# Patient Record
Sex: MALE | Race: WHITE | NOT HISPANIC OR LATINO | Employment: FULL TIME | URBAN - METROPOLITAN AREA
[De-identification: names, ages, dates, MRNs, and addresses within clinical notes are randomized per-mention and may not be internally consistent; named-entity substitution may affect disease eponyms.]

---

## 2020-10-17 ENCOUNTER — OFFICE VISIT (OUTPATIENT)
Dept: URGENT CARE | Facility: CLINIC | Age: 40
End: 2020-10-17
Payer: COMMERCIAL

## 2020-10-17 VITALS
BODY MASS INDEX: 28.88 KG/M2 | OXYGEN SATURATION: 98 % | TEMPERATURE: 98 F | SYSTOLIC BLOOD PRESSURE: 156 MMHG | HEIGHT: 74 IN | HEART RATE: 69 BPM | DIASTOLIC BLOOD PRESSURE: 93 MMHG | WEIGHT: 225 LBS

## 2020-10-17 DIAGNOSIS — Z20.822 CLOSE EXPOSURE TO COVID-19 VIRUS: Primary | ICD-10-CM

## 2020-10-17 LAB
CTP QC/QA: YES
SARS-COV-2 RDRP RESP QL NAA+PROBE: NEGATIVE

## 2020-10-17 PROCEDURE — U0002 COVID-19 LAB TEST NON-CDC: HCPCS | Mod: QW,S$GLB,, | Performed by: NURSE PRACTITIONER

## 2020-10-17 PROCEDURE — 99214 OFFICE O/P EST MOD 30 MIN: CPT | Mod: S$GLB,CS,, | Performed by: NURSE PRACTITIONER

## 2020-10-17 PROCEDURE — 99214 PR OFFICE/OUTPT VISIT, EST, LEVL IV, 30-39 MIN: ICD-10-PCS | Mod: S$GLB,CS,, | Performed by: NURSE PRACTITIONER

## 2020-10-17 PROCEDURE — U0002: ICD-10-PCS | Mod: QW,S$GLB,, | Performed by: NURSE PRACTITIONER

## 2020-10-17 RX ORDER — IBUPROFEN 800 MG/1
TABLET ORAL
COMMUNITY
Start: 2020-10-09

## 2020-10-17 RX ORDER — ZOLPIDEM TARTRATE 10 MG/1
TABLET ORAL
COMMUNITY
Start: 2020-09-28

## 2020-10-17 NOTE — PROGRESS NOTES
Subjective:       Patient ID: Gunnar Galindo is a 40 y.o. male.    Vitals:  vitals were not taken for this visit.     Chief Complaint: COVID-19 Concerns    Patient was exposed to covid -19.  Patient has no symptoms.      Constitution: Negative for chills, fatigue and fever.   HENT: Negative for congestion and sore throat.    Neck: Negative for painful lymph nodes.   Cardiovascular: Negative for chest pain and leg swelling.   Eyes: Negative for double vision and blurred vision.   Respiratory: Negative for cough and shortness of breath.    Gastrointestinal: Negative for nausea, vomiting and diarrhea.   Genitourinary: Negative for dysuria, frequency and urgency.   Musculoskeletal: Negative for joint pain, joint swelling, muscle cramps and muscle ache.   Skin: Negative for color change, pale and rash.   Allergic/Immunologic: Negative for seasonal allergies.   Neurological: Negative for dizziness, history of vertigo, light-headedness, passing out and headaches.   Hematologic/Lymphatic: Negative for swollen lymph nodes, easy bruising/bleeding and history of blood clots. Does not bruise/bleed easily.   Psychiatric/Behavioral: Negative for nervous/anxious, sleep disturbance and depression. The patient is not nervous/anxious.        Objective:      Physical Exam   Constitutional: He is oriented to person, place, and time. He appears well-developed. He is cooperative.  Non-toxic appearance. He does not appear ill. No distress.   HENT:   Head: Normocephalic and atraumatic.   Ears:   Right Ear: Hearing, tympanic membrane, external ear and ear canal normal.   Left Ear: Hearing, tympanic membrane, external ear and ear canal normal.   Nose: Nose normal. No mucosal edema, rhinorrhea or nasal deformity. No epistaxis. Right sinus exhibits no maxillary sinus tenderness and no frontal sinus tenderness. Left sinus exhibits no maxillary sinus tenderness and no frontal sinus tenderness.   Mouth/Throat: Uvula is midline, oropharynx  is clear and moist and mucous membranes are normal. No trismus in the jaw. Normal dentition. No uvula swelling. No oropharyngeal exudate, posterior oropharyngeal edema or posterior oropharyngeal erythema.   Eyes: Conjunctivae and lids are normal. No scleral icterus.   Neck: Trachea normal, full passive range of motion without pain and phonation normal. Neck supple. No neck rigidity. No edema and no erythema present.   Cardiovascular: Normal rate, regular rhythm, normal heart sounds and normal pulses.   Pulmonary/Chest: Effort normal and breath sounds normal. No respiratory distress. He has no decreased breath sounds. He has no rhonchi.   Abdominal: Normal appearance.   Musculoskeletal: Normal range of motion.         General: No deformity.   Neurological: He is alert and oriented to person, place, and time. He exhibits normal muscle tone. Coordination normal.   Skin: Skin is warm, dry, intact, not diaphoretic and not pale. Psychiatric: His speech is normal and behavior is normal. Judgment and thought content normal.   Nursing note and vitals reviewed.        Assessment:       1. Close exposure to COVID-19 virus        Plan:       Results for orders placed or performed in visit on 10/17/20   POCT COVID-19 Rapid Screening   Result Value Ref Range    POC Rapid COVID Negative Negative     Acceptable Yes        Close exposure to COVID-19 virus  -     POCT COVID-19 Rapid Screening      Patient Instructions   Please follow up with your Primary care provider within 2-5 days if your signs and symptoms have not resolved or worsen.     If your condition worsens or fails to improve we recommend that you receive another evaluation at the emergency room immediately or contact your primary medical clinic to discuss your concerns.    You must understand that you have received an Urgent Care treatment only and that you may be released before all of your medical problems are known or treated.   You, the patient, will  arrange for follow up care as instructed.       Instructions for Patients with Confirmed or Suspected COVID-19    If you are awaiting your test result, you will either be called or it will be released to the patient portal.  If you have any questions about your test, please visit [email or web address] or call our COVID-19 information line at [phone].      Instructions for non-hospitalized or discharged patients with confirmed or suspected COVID-19:       Stay home except to get medical care.    Separate yourself from other people and animals in your home.    Call ahead before visiting your doctor.    Wear a face mask.    Cover your coughs and sneezes.    Clean your hands often.    Avoid sharing personal household items.    Clean all high-touch surfaces every day.    Monitor your symptoms. Seek prompt medical attention if your illness is worsening (e.g., difficulty breathing). Before seeking care, call your healthcare provider.    If you have a medical emergency and must call 911, notify the dispatcher that you have or are being evaluated for COVID-19. If possible, put on a face mask before emergency medical services arrive.    Use the following symptom-based strategy to return to normal activity following a suspected or confirmed case of COVID-19. Continue isolation until:   o At least 3 days (72 hours) have passed since recovery defined as resolution of fever without the use of fever-reducing medications and improvement in respiratory symptoms (e.g. cough, shortness of breath), and   o At least 10 days have passed since the first positive test.       As one of the next steps, you will receive a call or text from the [place] Department of Health ([facility] COVID-19 Tracing Team. See the contact information below so you know not to ignore the health departments call. It is important that you contact them back immediately so they can help.     Contact Tracer Number:  [phone]  Caller ID for  most carriers: [facility]    What is contact tracing?   Contact tracing is a process that helps identify everyone who has been in close contact with an infected person. Contact tracers let those people know they may have been exposed and guide them on next steps. Confidentiality is important for everyone; no one will be told who may have exposed them to the virus.   Your involvement is important. The more we know about where and how this virus is spreading, the better chance we have at stopping it from spreading further.  What does exposure mean?   Exposure means you have been within 6 feet for more than 15 minutes with a person who has or had COVID-19.  What kind of questions do the contact tracers ask?   A contact tracer will confirm your basic contact information including name, address, phone number, and next of kin, as well as asking about any symptoms you may have had. Theyll also ask you how you think you may have gotten sick, such as places where you may have been exposed to the virus, and people you were with. Those names will never be shared with anyone outside of that call, and will only be used to help trace and stop the spread of the virus.   I have privacy concerns. How will the state use my information?   Your privacy about your health is important. All calls are completed using call centers that use the appropriate health privacy protection measures (HIPAA compliance), meaning that your patient information is safe. No one will ever ask you any questions related to immigration status. Your health comes first.   Do I have to participate?   You do not have to participate, but we strongly encourage you to. Contact tracing can help us catch and control new outbreaks as theyre developing to keep your friends and family safe.   What if I dont hear from anyone?   If you dont receive a call within 24 hours, you can call the number above right away to inquire about your status. That line is  open from 8:00 am - 8:00 p.m., 7 days a week.  Contact tracing saves lives! Together, we have the power to beat this virus and keep our loved ones and neighbors safe.       Instructions for household members, intimate partners and caregivers in a non-healthcare setting of a patient with confirmed or suspected COVID-19:         Close contacts should monitor their health and call their healthcare provider right away if they develop symptoms suggestive of COVID-19 (e.g., fever, cough, shortness of breath).    Stay home except to get medical care. Separate yourself from other people and animals in the home.   Monitor the patients symptoms. If the patient is getting sicker, call his or her healthcare provider. If the patient has a medical emergency and you need to call 911, notify the dispatch personnel that the patient has or is being evaluated for COVID-19.    Wear a facemask when around other people such as sharing a room or vehicle and before entering a healthcare provider's office.   Cover coughs and sneezes with a tissue. Throw used tissues in a lined trash can immediately and wash hands.   Clean hands often with soap and water for at least 20 seconds or with an alcohol-based hand , rubbing hands together until they feel dry. Avoid touching your eyes, nose, and mouth with unwashed hands.   Clean all high-touch; surfaces every day, including counters, tabletops, doorknobs, bathroom fixtures, toilets, phones, keyboards, tablets, bedside tables, etc. Use a household cleaning spray or wipe according to label instructions.   Avoid sharing personal household items such as dishes, drinking glasses, cups, towels, bedding, etc. After these items are used, they should be washed thoroughly with soap and water.   Continue isolation until:   At least 3 days (72 hours) have passed since recovery defined as resolution of fever without the use of fever-reducing medications and improvement in respiratory  symptoms (e.g. cough, shortness of breath), and    At least 10 days have passed since the patients first positive test.    https://www.cdc.gov/coronavirus/2019-ncov/your-health/index.htm

## 2020-10-17 NOTE — PATIENT INSTRUCTIONS
Please follow up with your Primary care provider within 2-5 days if your signs and symptoms have not resolved or worsen.     If your condition worsens or fails to improve we recommend that you receive another evaluation at the emergency room immediately or contact your primary medical clinic to discuss your concerns.    You must understand that you have received an Urgent Care treatment only and that you may be released before all of your medical problems are known or treated.   You, the patient, will arrange for follow up care as instructed.       Instructions for Patients with Confirmed or Suspected COVID-19    If you are awaiting your test result, you will either be called or it will be released to the patient portal.  If you have any questions about your test, please visit [email or web address] or call our COVID-19 information line at [phone].      Instructions for non-hospitalized or discharged patients with confirmed or suspected COVID-19:       Stay home except to get medical care.    Separate yourself from other people and animals in your home.    Call ahead before visiting your doctor.    Wear a face mask.    Cover your coughs and sneezes.    Clean your hands often.    Avoid sharing personal household items.    Clean all high-touch surfaces every day.    Monitor your symptoms. Seek prompt medical attention if your illness is worsening (e.g., difficulty breathing). Before seeking care, call your healthcare provider.    If you have a medical emergency and must call 911, notify the dispatcher that you have or are being evaluated for COVID-19. If possible, put on a face mask before emergency medical services arrive.    Use the following symptom-based strategy to return to normal activity following a suspected or confirmed case of COVID-19. Continue isolation until:   o At least 3 days (72 hours) have passed since recovery defined as resolution of fever without the use of fever-reducing  medications and improvement in respiratory symptoms (e.g. cough, shortness of breath), and   o At least 10 days have passed since the first positive test.       As one of the next steps, you will receive a call or text from the [place] Department of Health ([facility]) COVID-19 Tracing Team. See the contact information below so you know not to ignore the health departments call. It is important that you contact them back immediately so they can help.     Contact Tracer Number:  [phone]  Caller ID for most carriers: [facility]    What is contact tracing?   Contact tracing is a process that helps identify everyone who has been in close contact with an infected person. Contact tracers let those people know they may have been exposed and guide them on next steps. Confidentiality is important for everyone; no one will be told who may have exposed them to the virus.   Your involvement is important. The more we know about where and how this virus is spreading, the better chance we have at stopping it from spreading further.  What does exposure mean?   Exposure means you have been within 6 feet for more than 15 minutes with a person who has or had COVID-19.  What kind of questions do the contact tracers ask?   A contact tracer will confirm your basic contact information including name, address, phone number, and next of kin, as well as asking about any symptoms you may have had. Theyll also ask you how you think you may have gotten sick, such as places where you may have been exposed to the virus, and people you were with. Those names will never be shared with anyone outside of that call, and will only be used to help trace and stop the spread of the virus.   I have privacy concerns. How will the state use my information?   Your privacy about your health is important. All calls are completed using call centers that use the appropriate health privacy protection measures (HIPAA compliance), meaning that your  patient information is safe. No one will ever ask you any questions related to immigration status. Your health comes first.   Do I have to participate?   You do not have to participate, but we strongly encourage you to. Contact tracing can help us catch and control new outbreaks as theyre developing to keep your friends and family safe.   What if I dont hear from anyone?   If you dont receive a call within 24 hours, you can call the number above right away to inquire about your status. That line is open from 8:00 am - 8:00 p.m., 7 days a week.  Contact tracing saves lives! Together, we have the power to beat this virus and keep our loved ones and neighbors safe.       Instructions for household members, intimate partners and caregivers in a non-healthcare setting of a patient with confirmed or suspected COVID-19:         Close contacts should monitor their health and call their healthcare provider right away if they develop symptoms suggestive of COVID-19 (e.g., fever, cough, shortness of breath).    Stay home except to get medical care. Separate yourself from other people and animals in the home.   Monitor the patients symptoms. If the patient is getting sicker, call his or her healthcare provider. If the patient has a medical emergency and you need to call 911, notify the dispatch personnel that the patient has or is being evaluated for COVID-19.    Wear a facemask when around other people such as sharing a room or vehicle and before entering a healthcare provider's office.   Cover coughs and sneezes with a tissue. Throw used tissues in a lined trash can immediately and wash hands.   Clean hands often with soap and water for at least 20 seconds or with an alcohol-based hand , rubbing hands together until they feel dry. Avoid touching your eyes, nose, and mouth with unwashed hands.   Clean all high-touch; surfaces every day, including counters, tabletops, doorknobs, bathroom fixtures,  toilets, phones, keyboards, tablets, bedside tables, etc. Use a household cleaning spray or wipe according to label instructions.   Avoid sharing personal household items such as dishes, drinking glasses, cups, towels, bedding, etc. After these items are used, they should be washed thoroughly with soap and water.   Continue isolation until:   At least 3 days (72 hours) have passed since recovery defined as resolution of fever without the use of fever-reducing medications and improvement in respiratory symptoms (e.g. cough, shortness of breath), and    At least 10 days have passed since the patients first positive test.    https://www.cdc.gov/coronavirus/2019-ncov/your-health/index.htm

## 2023-01-03 ENCOUNTER — OFFICE VISIT (OUTPATIENT)
Dept: INTERNAL MEDICINE | Facility: CLINIC | Age: 43
End: 2023-01-03
Payer: COMMERCIAL

## 2023-01-03 VITALS
DIASTOLIC BLOOD PRESSURE: 86 MMHG | BODY MASS INDEX: 30.45 KG/M2 | OXYGEN SATURATION: 95 % | WEIGHT: 237.31 LBS | HEIGHT: 74 IN | SYSTOLIC BLOOD PRESSURE: 148 MMHG | TEMPERATURE: 99 F | HEART RATE: 104 BPM

## 2023-01-03 DIAGNOSIS — E66.9 OBESITY (BMI 30.0-34.9): ICD-10-CM

## 2023-01-03 DIAGNOSIS — I10 ESSENTIAL HYPERTENSION: Primary | ICD-10-CM

## 2023-01-03 DIAGNOSIS — F51.04 CHRONIC INSOMNIA: ICD-10-CM

## 2023-01-03 DIAGNOSIS — E78.2 MIXED HYPERLIPIDEMIA: ICD-10-CM

## 2023-01-03 DIAGNOSIS — Z11.4 SCREENING FOR HIV (HUMAN IMMUNODEFICIENCY VIRUS): ICD-10-CM

## 2023-01-03 DIAGNOSIS — Z11.59 NEED FOR HEPATITIS C SCREENING TEST: ICD-10-CM

## 2023-01-03 DIAGNOSIS — F17.200 TOBACCO USE DISORDER: ICD-10-CM

## 2023-01-03 PROBLEM — E66.811 OBESITY (BMI 30.0-34.9): Status: ACTIVE | Noted: 2022-07-15

## 2023-01-03 PROBLEM — E78.00 HYPERCHOLESTEROLEMIA: Status: ACTIVE | Noted: 2022-07-15

## 2023-01-03 PROCEDURE — 99999 PR PBB SHADOW E&M-EST. PATIENT-LVL IV: ICD-10-PCS | Mod: PBBFAC,,, | Performed by: FAMILY MEDICINE

## 2023-01-03 PROCEDURE — 90686 FLU VACCINE (QUAD) GREATER THAN OR EQUAL TO 3YO PRESERVATIVE FREE IM: ICD-10-PCS | Mod: S$GLB,,, | Performed by: FAMILY MEDICINE

## 2023-01-03 PROCEDURE — 99203 OFFICE O/P NEW LOW 30 MIN: CPT | Mod: 25,S$GLB,, | Performed by: FAMILY MEDICINE

## 2023-01-03 PROCEDURE — 90471 IMMUNIZATION ADMIN: CPT | Mod: S$GLB,,, | Performed by: FAMILY MEDICINE

## 2023-01-03 PROCEDURE — 90471 FLU VACCINE (QUAD) GREATER THAN OR EQUAL TO 3YO PRESERVATIVE FREE IM: ICD-10-PCS | Mod: S$GLB,,, | Performed by: FAMILY MEDICINE

## 2023-01-03 PROCEDURE — 90686 IIV4 VACC NO PRSV 0.5 ML IM: CPT | Mod: S$GLB,,, | Performed by: FAMILY MEDICINE

## 2023-01-03 PROCEDURE — 99203 PR OFFICE/OUTPT VISIT, NEW, LEVL III, 30-44 MIN: ICD-10-PCS | Mod: 25,S$GLB,, | Performed by: FAMILY MEDICINE

## 2023-01-03 PROCEDURE — 99999 PR PBB SHADOW E&M-EST. PATIENT-LVL IV: CPT | Mod: PBBFAC,,, | Performed by: FAMILY MEDICINE

## 2023-01-03 RX ORDER — METOPROLOL SUCCINATE 25 MG/1
25 TABLET, EXTENDED RELEASE ORAL DAILY
Qty: 30 TABLET | Refills: 2 | Status: SHIPPED | OUTPATIENT
Start: 2023-01-03 | End: 2023-10-03 | Stop reason: SDUPTHER

## 2023-01-03 NOTE — ASSESSMENT & PLAN NOTE
Body mass index is 30.47 kg/m².    Wt Readings from Last 10 Encounters:   01/03/23 107.6 kg (237 lb 5.2 oz)   10/17/20 102.1 kg (225 lb)

## 2023-01-03 NOTE — PROGRESS NOTES
Subjective:       Patient ID: Gunnar Galindo is a 42 y.o. male here today to establish care.    Chief Complaint: Hypertension    Patient presents to clinic today to establish care/address elevated blood pressure. Also desires to quit smoking. Reports ambien prn insomnia with little relief. Patient is otherwise without concerns today.    Review of Systems   Constitutional:  Negative for chills, fatigue, fever and unexpected weight change.   Eyes:  Negative for visual disturbance.   Respiratory:  Negative for shortness of breath.    Cardiovascular:  Negative for chest pain.   Musculoskeletal:  Negative for myalgias.   Neurological:  Negative for headaches.     Objective:      Physical Exam  Vitals reviewed.   Constitutional:       General: He is not in acute distress.     Appearance: He is well-developed.   HENT:      Head: Normocephalic and atraumatic.   Eyes:      General: Lids are normal. No scleral icterus.     Extraocular Movements: Extraocular movements intact.      Conjunctiva/sclera: Conjunctivae normal.      Pupils: Pupils are equal, round, and reactive to light.   Cardiovascular:      Rate and Rhythm: Normal rate and regular rhythm.      Heart sounds: No murmur heard.    No friction rub. No gallop.   Pulmonary:      Effort: Pulmonary effort is normal.      Breath sounds: Normal breath sounds. No decreased breath sounds, wheezing, rhonchi or rales.   Musculoskeletal:      Right lower leg: No edema.      Left lower leg: No edema.   Neurological:      Mental Status: He is alert and oriented to person, place, and time.      Cranial Nerves: No cranial nerve deficit.      Gait: Gait normal.   Psychiatric:         Mood and Affect: Mood and affect normal.       Assessment:       1. Essential hypertension    2. Tobacco use disorder    3. Mixed hyperlipidemia    4. Obesity (BMI 30.0-34.9)    5. Chronic insomnia    6. Need for hepatitis C screening test    7. Screening for HIV (human immunodeficiency virus)           Plan:   1. Essential hypertension  Assessment & Plan:  After discussion will start metoprolol 25 mg daily; patient will return in 1 month for follow-up; notify MD for any problems with medication    Orders:  -     metoprolol succinate (TOPROL-XL) 25 MG 24 hr tablet; Take 1 tablet (25 mg total) by mouth once daily.  Dispense: 30 tablet; Refill: 2    2. Tobacco use disorder  Assessment & Plan:  Referral to smoking cessation program    Orders:  -     Ambulatory referral/consult to Smoking Cessation Program; Future; Expected date: 01/10/2023    3. Mixed hyperlipidemia  Assessment & Plan:  Status pending labs    Orders:  -     Comprehensive Metabolic Panel; Future; Expected date: 01/03/2023  -     Lipid Panel; Future; Expected date: 01/03/2023    4. Obesity (BMI 30.0-34.9)  Assessment & Plan:  Body mass index is 30.47 kg/m².    Wt Readings from Last 10 Encounters:   01/03/23 107.6 kg (237 lb 5.2 oz)   10/17/20 102.1 kg (225 lb)           5. Chronic insomnia  Assessment & Plan:  On Ambien p.r.n. per previous primary care; documentation indicates about 3 times weekly; patient reports this has been ineffective recently; discussed sleep hygiene and encouraged to make some lifestyle changes; will reassess in 1 month and consider trazodone if needed; patient expressed understanding and agreement with plan.      6. Need for hepatitis C screening test  -     Hepatitis C Antibody; Future; Expected date: 01/03/2023    7. Screening for HIV (human immunodeficiency virus)  -     HIV 1/2 Ag/Ab (4th Gen); Future; Expected date: 01/03/2023        Health Maintenance reviewed/updated.  Flu vaccine today.

## 2023-01-03 NOTE — ASSESSMENT & PLAN NOTE
On Ambien p.r.n. per previous primary care; documentation indicates about 3 times weekly; patient reports this has been ineffective recently; discussed sleep hygiene and encouraged to make some lifestyle changes; will reassess in 1 month and consider trazodone if needed; patient expressed understanding and agreement with plan.

## 2023-01-03 NOTE — ASSESSMENT & PLAN NOTE
After discussion will start metoprolol 25 mg daily; patient will return in 1 month for follow-up; notify MD for any problems with medication

## 2023-01-12 ENCOUNTER — TELEPHONE (OUTPATIENT)
Dept: SMOKING CESSATION | Facility: CLINIC | Age: 43
End: 2023-01-12
Payer: COMMERCIAL

## 2023-01-12 ENCOUNTER — CLINICAL SUPPORT (OUTPATIENT)
Dept: SMOKING CESSATION | Facility: CLINIC | Age: 43
End: 2023-01-12

## 2023-01-12 DIAGNOSIS — F17.200 NICOTINE DEPENDENCE: Primary | ICD-10-CM

## 2023-01-12 PROCEDURE — 99404 PREV MED CNSL INDIV APPRX 60: CPT | Mod: S$GLB,,, | Performed by: GENERAL PRACTICE

## 2023-01-12 PROCEDURE — 99999 PR PBB SHADOW E&M-EST. PATIENT-LVL II: CPT | Mod: PBBFAC,,,

## 2023-01-12 PROCEDURE — 99404 PR PREVENT COUNSEL,INDIV,60 MIN: ICD-10-PCS | Mod: S$GLB,,, | Performed by: GENERAL PRACTICE

## 2023-01-12 PROCEDURE — 99999 PR PBB SHADOW E&M-EST. PATIENT-LVL II: ICD-10-PCS | Mod: PBBFAC,,,

## 2023-01-12 RX ORDER — BUPROPION HYDROCHLORIDE 150 MG/1
TABLET, EXTENDED RELEASE ORAL
Qty: 60 TABLET | Refills: 0 | Status: SHIPPED | OUTPATIENT
Start: 2023-01-12 | End: 2023-02-13

## 2023-01-12 NOTE — Clinical Note
Patient intake for Quit 1 Episode.  Patient will be participating in bi-weekly tobacco cessation meetings and will begin the prescribed tobacco cessation medication regimen of 150 mg Wellbutrin BID. FTND score of 5 indicates a moderate to high dependence on nicotine. ARTI-D score of 7 is perceived as no mental distress / depression at this time.

## 2023-01-24 ENCOUNTER — CLINICAL SUPPORT (OUTPATIENT)
Dept: SMOKING CESSATION | Facility: CLINIC | Age: 43
End: 2023-01-24

## 2023-01-24 DIAGNOSIS — F17.200 NICOTINE DEPENDENCE: Primary | ICD-10-CM

## 2023-01-24 PROCEDURE — 99402 PREV MED CNSL INDIV APPRX 30: CPT | Mod: 95,,, | Performed by: GENERAL PRACTICE

## 2023-01-24 PROCEDURE — 99402 PR PREVENT COUNSEL,INDIV,30 MIN: ICD-10-PCS | Mod: 95,,, | Performed by: GENERAL PRACTICE

## 2023-01-24 RX ORDER — DIPHENHYDRAMINE HCL 25 MG
4 CAPSULE ORAL
Qty: 100 EACH | Refills: 2 | Status: SHIPPED | OUTPATIENT
Start: 2023-01-24

## 2023-01-24 NOTE — Clinical Note
Patient was seen today by virtual visit with synchronous audio and video for a smoking cessation follow up visit. He is smoking 10-15 cpd and was smoking 20 cpd. Commended patient on his efforts towards quitting tobacco use. First thing in the morning and after eating are difficult times for him to refrain from smoking. He has been successful in not smoking for 4-5 hours at a time during the work day. Discussed nicotine vs. habit, identifying and managing triggers, healthy substitutions, strategies to eliminate tobacco, and reviewed instructions for use of 4 mg nicotine gum. The patient remains on the prescribed tobacco cessation medication regimen of 150 mg Wellbutrin BID without any negative side effects at this time.  This week he will work on limiting smoking to 10 cpd, increasing intervals of time between smoking, and using nicotine gum in the place of cigarettes. The patient denies any abnormal behavioral or mental changes at this time.  Will continue to encourage and monitor his progress.

## 2023-01-25 NOTE — PROGRESS NOTES
Individual Follow-Up Form    1/24/2023    Quit Date: TBD    Clinical Status of Patient: Outpatient    Continuing Medication: yes  Wellbutrin     Target Symptoms: Withdrawal and medication side effects. The following were  rated moderate (3) to severe (4) on TCRS:  Moderate (3): none  Severe (4): none    Comments: Patient was seen today by virtual visit with synchronous audio and video for a smoking cessation follow up visit. He is smoking 10-15 cpd and was smoking 20 cpd. Commended patient on his efforts towards quitting tobacco use. First thing in the morning and after eating are difficult times for him to refrain from smoking. He has been successful in not smoking for 4-5 hours at a time during the work day. Discussed nicotine vs. habit, identifying and managing triggers, healthy substitutions, strategies to eliminate tobacco, and reviewed instructions for use of 4 mg nicotine gum. The patient remains on the prescribed tobacco cessation medication regimen of 150 mg Wellbutrin BID without any negative side effects at this time.  This week he will work on limiting smoking to 10 cpd, increasing intervals of time between smoking, and using nicotine gum in the place of cigarettes. The patient denies any abnormal behavioral or mental changes at this time.  Will continue to encourage and monitor his progress.     Diagnosis: F17.200    Next Visit: 2 weeks

## 2023-02-04 ENCOUNTER — LAB VISIT (OUTPATIENT)
Dept: LAB | Facility: HOSPITAL | Age: 43
End: 2023-02-04
Attending: FAMILY MEDICINE
Payer: COMMERCIAL

## 2023-02-04 DIAGNOSIS — Z11.4 SCREENING FOR HIV (HUMAN IMMUNODEFICIENCY VIRUS): ICD-10-CM

## 2023-02-04 DIAGNOSIS — Z11.59 NEED FOR HEPATITIS C SCREENING TEST: ICD-10-CM

## 2023-02-04 DIAGNOSIS — E78.2 MIXED HYPERLIPIDEMIA: ICD-10-CM

## 2023-02-04 LAB
ALBUMIN SERPL BCP-MCNC: 4.3 G/DL (ref 3.5–5.2)
ALP SERPL-CCNC: 72 U/L (ref 55–135)
ALT SERPL W/O P-5'-P-CCNC: 36 U/L (ref 10–44)
ANION GAP SERPL CALC-SCNC: 11 MMOL/L (ref 8–16)
AST SERPL-CCNC: 16 U/L (ref 10–40)
BILIRUB SERPL-MCNC: 0.5 MG/DL (ref 0.1–1)
BUN SERPL-MCNC: 15 MG/DL (ref 6–20)
CALCIUM SERPL-MCNC: 9.7 MG/DL (ref 8.7–10.5)
CHLORIDE SERPL-SCNC: 108 MMOL/L (ref 95–110)
CHOLEST SERPL-MCNC: 203 MG/DL (ref 120–199)
CHOLEST/HDLC SERPL: 6 {RATIO} (ref 2–5)
CO2 SERPL-SCNC: 24 MMOL/L (ref 23–29)
CREAT SERPL-MCNC: 0.8 MG/DL (ref 0.5–1.4)
EST. GFR  (NO RACE VARIABLE): >60 ML/MIN/1.73 M^2
GLUCOSE SERPL-MCNC: 90 MG/DL (ref 70–110)
HCV AB SERPL QL IA: NORMAL
HDLC SERPL-MCNC: 34 MG/DL (ref 40–75)
HDLC SERPL: 16.7 % (ref 20–50)
HIV 1+2 AB+HIV1 P24 AG SERPL QL IA: NORMAL
LDLC SERPL CALC-MCNC: 109.8 MG/DL (ref 63–159)
NONHDLC SERPL-MCNC: 169 MG/DL
POTASSIUM SERPL-SCNC: 5.1 MMOL/L (ref 3.5–5.1)
PROT SERPL-MCNC: 7.1 G/DL (ref 6–8.4)
SODIUM SERPL-SCNC: 143 MMOL/L (ref 136–145)
TRIGL SERPL-MCNC: 296 MG/DL (ref 30–150)

## 2023-02-04 PROCEDURE — 87389 HIV-1 AG W/HIV-1&-2 AB AG IA: CPT | Performed by: FAMILY MEDICINE

## 2023-02-04 PROCEDURE — 36415 COLL VENOUS BLD VENIPUNCTURE: CPT | Performed by: FAMILY MEDICINE

## 2023-02-04 PROCEDURE — 80061 LIPID PANEL: CPT | Performed by: FAMILY MEDICINE

## 2023-02-04 PROCEDURE — 86803 HEPATITIS C AB TEST: CPT | Performed by: FAMILY MEDICINE

## 2023-02-04 PROCEDURE — 80053 COMPREHEN METABOLIC PANEL: CPT | Performed by: FAMILY MEDICINE

## 2023-02-13 ENCOUNTER — CLINICAL SUPPORT (OUTPATIENT)
Dept: SMOKING CESSATION | Facility: CLINIC | Age: 43
End: 2023-02-13

## 2023-02-13 DIAGNOSIS — F17.200 NICOTINE DEPENDENCE: Primary | ICD-10-CM

## 2023-02-13 PROCEDURE — 99402 PREV MED CNSL INDIV APPRX 30: CPT | Mod: S$GLB,,, | Performed by: GENERAL PRACTICE

## 2023-02-13 PROCEDURE — 99402 PR PREVENT COUNSEL,INDIV,30 MIN: ICD-10-PCS | Mod: S$GLB,,, | Performed by: GENERAL PRACTICE

## 2023-02-13 PROCEDURE — 99999 PR PBB SHADOW E&M-EST. PATIENT-LVL I: ICD-10-PCS | Mod: PBBFAC,,,

## 2023-02-13 PROCEDURE — 99999 PR PBB SHADOW E&M-EST. PATIENT-LVL I: CPT | Mod: PBBFAC,,,

## 2023-02-13 NOTE — PROGRESS NOTES
Individual Follow-Up Form    2/13/2023    Quit Date: TBD    Clinical Status of Patient: Outpatient    Continuing Medication: yes  Wellbutrin    Other Medications: nicotine gum     Target Symptoms: Withdrawal and medication side effects. The following were  rated moderate (3) to severe (4) on TCRS:  Moderate (3): none  Severe (4): none    Comments: Spoke to patient over the phone in regard to his smoking cessation progress. He is smoking 3-4 cpd and was smoking 20 cpd. Commended patient on his progress towards quitting tobacco use. First thing in the morning is the most difficult time for him to refrain from smoking. Discussed using nicotine gum upon awakening in the morning to alleviate strong craving to smoke in the morning. The patient remains on the prescribed tobacco cessation medication regimen of 150 mg Wellbutrin BID and 4 mg nicotine gum as needed without any negative side effects at this time.  The patient denies any abnormal behavioral or mental changes at this time.  Will continue to encourage and monitor his progress.     Diagnosis: F17.200    Next Visit: 3 weeks

## 2023-02-13 NOTE — Clinical Note
Spoke to patient over the phone in regard to his smoking cessation progress. He is smoking 3-4 cpd and was smoking 20 cpd. Commended patient on his progress towards quitting tobacco use. First thing in the morning is the most difficult time for him to refrain from smoking. Discussed using nicotine gum upon awakening in the morning to alleviate strong craving to smoke in the morning. The patient remains on the prescribed tobacco cessation medication regimen of 150 mg Wellbutrin BID and 4 mg nicotine gum as needed without any negative side effects at this time.  The patient denies any abnormal behavioral or mental changes at this time.  Will continue to encourage and monitor his progress.

## 2023-03-30 ENCOUNTER — TELEPHONE (OUTPATIENT)
Dept: SMOKING CESSATION | Facility: CLINIC | Age: 43
End: 2023-03-30
Payer: COMMERCIAL

## 2023-03-30 NOTE — TELEPHONE ENCOUNTER
Attempted to contact patient in regard to cancelled virtual appt. Left recorded message with return contact information.

## 2023-04-17 ENCOUNTER — TELEPHONE (OUTPATIENT)
Dept: SMOKING CESSATION | Facility: CLINIC | Age: 43
End: 2023-04-17
Payer: COMMERCIAL

## 2023-07-24 ENCOUNTER — TELEPHONE (OUTPATIENT)
Dept: SMOKING CESSATION | Facility: CLINIC | Age: 43
End: 2023-07-24
Payer: COMMERCIAL

## 2023-10-03 ENCOUNTER — OFFICE VISIT (OUTPATIENT)
Dept: INTERNAL MEDICINE | Facility: CLINIC | Age: 43
End: 2023-10-03
Payer: COMMERCIAL

## 2023-10-03 VITALS
HEART RATE: 92 BPM | WEIGHT: 246.25 LBS | DIASTOLIC BLOOD PRESSURE: 94 MMHG | SYSTOLIC BLOOD PRESSURE: 142 MMHG | OXYGEN SATURATION: 96 % | TEMPERATURE: 97 F | BODY MASS INDEX: 31.62 KG/M2

## 2023-10-03 DIAGNOSIS — E78.2 MIXED HYPERLIPIDEMIA: ICD-10-CM

## 2023-10-03 DIAGNOSIS — Z23 NEED FOR INFLUENZA VACCINATION: ICD-10-CM

## 2023-10-03 DIAGNOSIS — F17.210 CIGARETTE NICOTINE DEPENDENCE WITHOUT COMPLICATION: ICD-10-CM

## 2023-10-03 DIAGNOSIS — F51.04 CHRONIC INSOMNIA: ICD-10-CM

## 2023-10-03 DIAGNOSIS — Z13.1 SCREENING FOR DIABETES MELLITUS: ICD-10-CM

## 2023-10-03 DIAGNOSIS — I10 ESSENTIAL HYPERTENSION: ICD-10-CM

## 2023-10-03 DIAGNOSIS — Z00.00 ROUTINE GENERAL MEDICAL EXAMINATION AT A HEALTH CARE FACILITY: Primary | ICD-10-CM

## 2023-10-03 DIAGNOSIS — F17.200 TOBACCO USE DISORDER: ICD-10-CM

## 2023-10-03 PROBLEM — E66.9 OBESITY (BMI 30.0-34.9): Status: RESOLVED | Noted: 2022-07-15 | Resolved: 2023-10-03

## 2023-10-03 PROBLEM — E66.811 OBESITY (BMI 30.0-34.9): Status: RESOLVED | Noted: 2022-07-15 | Resolved: 2023-10-03

## 2023-10-03 PROCEDURE — 3080F DIAST BP >= 90 MM HG: CPT | Mod: CPTII,S$GLB,, | Performed by: PHYSICIAN ASSISTANT

## 2023-10-03 PROCEDURE — 3008F BODY MASS INDEX DOCD: CPT | Mod: CPTII,S$GLB,, | Performed by: PHYSICIAN ASSISTANT

## 2023-10-03 PROCEDURE — 90686 FLU VACCINE (QUAD) GREATER THAN OR EQUAL TO 3YO PRESERVATIVE FREE IM: ICD-10-PCS | Mod: S$GLB,,, | Performed by: PHYSICIAN ASSISTANT

## 2023-10-03 PROCEDURE — 1159F PR MEDICATION LIST DOCUMENTED IN MEDICAL RECORD: ICD-10-PCS | Mod: CPTII,S$GLB,, | Performed by: PHYSICIAN ASSISTANT

## 2023-10-03 PROCEDURE — 99999 PR PBB SHADOW E&M-EST. PATIENT-LVL IV: ICD-10-PCS | Mod: PBBFAC,,, | Performed by: PHYSICIAN ASSISTANT

## 2023-10-03 PROCEDURE — 3077F PR MOST RECENT SYSTOLIC BLOOD PRESSURE >= 140 MM HG: ICD-10-PCS | Mod: CPTII,S$GLB,, | Performed by: PHYSICIAN ASSISTANT

## 2023-10-03 PROCEDURE — 3008F PR BODY MASS INDEX (BMI) DOCUMENTED: ICD-10-PCS | Mod: CPTII,S$GLB,, | Performed by: PHYSICIAN ASSISTANT

## 2023-10-03 PROCEDURE — 99396 PREV VISIT EST AGE 40-64: CPT | Mod: 25,S$GLB,, | Performed by: PHYSICIAN ASSISTANT

## 2023-10-03 PROCEDURE — 90686 IIV4 VACC NO PRSV 0.5 ML IM: CPT | Mod: S$GLB,,, | Performed by: PHYSICIAN ASSISTANT

## 2023-10-03 PROCEDURE — 90471 FLU VACCINE (QUAD) GREATER THAN OR EQUAL TO 3YO PRESERVATIVE FREE IM: ICD-10-PCS | Mod: S$GLB,,, | Performed by: PHYSICIAN ASSISTANT

## 2023-10-03 PROCEDURE — 3077F SYST BP >= 140 MM HG: CPT | Mod: CPTII,S$GLB,, | Performed by: PHYSICIAN ASSISTANT

## 2023-10-03 PROCEDURE — 3080F PR MOST RECENT DIASTOLIC BLOOD PRESSURE >= 90 MM HG: ICD-10-PCS | Mod: CPTII,S$GLB,, | Performed by: PHYSICIAN ASSISTANT

## 2023-10-03 PROCEDURE — 1159F MED LIST DOCD IN RCRD: CPT | Mod: CPTII,S$GLB,, | Performed by: PHYSICIAN ASSISTANT

## 2023-10-03 PROCEDURE — 99999 PR PBB SHADOW E&M-EST. PATIENT-LVL IV: CPT | Mod: PBBFAC,,, | Performed by: PHYSICIAN ASSISTANT

## 2023-10-03 PROCEDURE — 99396 PR PREVENTIVE VISIT,EST,40-64: ICD-10-PCS | Mod: 25,S$GLB,, | Performed by: PHYSICIAN ASSISTANT

## 2023-10-03 PROCEDURE — 90471 IMMUNIZATION ADMIN: CPT | Mod: S$GLB,,, | Performed by: PHYSICIAN ASSISTANT

## 2023-10-03 RX ORDER — BUPROPION HYDROCHLORIDE 150 MG/1
TABLET, EXTENDED RELEASE ORAL
Qty: 60 TABLET | Refills: 5 | Status: SHIPPED | OUTPATIENT
Start: 2023-10-03

## 2023-10-03 RX ORDER — METOPROLOL SUCCINATE 25 MG/1
25 TABLET, EXTENDED RELEASE ORAL DAILY
Qty: 30 TABLET | Refills: 11 | Status: SHIPPED | OUTPATIENT
Start: 2023-10-03 | End: 2024-10-02

## 2023-10-03 NOTE — ASSESSMENT & PLAN NOTE
/94, start metoprolol, apparently patient did not start this medication at last visit.  Schedule nurse visit in 2 weeks for BP recheck

## 2023-10-03 NOTE — PROGRESS NOTES
Subjective:       Patient ID: Gunnar Galindo is a 43 y.o. male.    Chief Complaint: Annual Exam      Patient presents to clinic today for annual physical exam.        Review of Systems   Constitutional:  Negative for activity change and unexpected weight change.   HENT:  Negative for hearing loss, rhinorrhea and trouble swallowing.    Eyes:  Negative for discharge and visual disturbance.   Respiratory:  Negative for chest tightness and wheezing.    Cardiovascular:  Negative for chest pain and palpitations.   Gastrointestinal:  Negative for blood in stool, constipation, diarrhea and vomiting.   Endocrine: Negative for polydipsia and polyuria.   Genitourinary:  Negative for difficulty urinating, hematuria and urgency.   Musculoskeletal:  Negative for arthralgias, joint swelling and neck pain.   Neurological:  Positive for headaches. Negative for weakness.   Psychiatric/Behavioral:  Negative for confusion and dysphoric mood.        Objective:      Physical Exam  Vitals and nursing note reviewed.   Constitutional:       General: He is not in acute distress.     Appearance: He is well-developed.   HENT:      Head: Normocephalic and atraumatic.      Right Ear: Tympanic membrane, ear canal and external ear normal.      Left Ear: Tympanic membrane, ear canal and external ear normal.      Nose: Nose normal.      Mouth/Throat:      Lips: Pink.      Mouth: Mucous membranes are moist.      Pharynx: Oropharynx is clear. Uvula midline.   Eyes:      General: Lids are normal. No scleral icterus.     Conjunctiva/sclera: Conjunctivae normal.      Pupils: Pupils are equal, round, and reactive to light.   Neck:      Thyroid: No thyromegaly.   Cardiovascular:      Rate and Rhythm: Normal rate and regular rhythm.      Pulses: Normal pulses.   Pulmonary:      Effort: Pulmonary effort is normal.      Breath sounds: Normal breath sounds. No wheezing or rales.   Abdominal:      General: Bowel sounds are normal. There is no distension.       Palpations: Abdomen is soft. There is no mass.      Tenderness: There is no abdominal tenderness.   Musculoskeletal:         General: No tenderness. Normal range of motion.      Cervical back: Normal range of motion and neck supple.      Right lower leg: No edema.      Left lower leg: No edema.   Lymphadenopathy:      Cervical: No cervical adenopathy.   Skin:     General: Skin is warm and dry.      Findings: No rash.   Neurological:      Mental Status: He is alert.      Cranial Nerves: No cranial nerve deficit.   Psychiatric:         Mood and Affect: Mood and affect normal.         Assessment:       1. Routine general medical examination at a health care facility    2. Essential hypertension    3. Mixed hyperlipidemia    4. Chronic insomnia    5. Cigarette nicotine dependence without complication    6. Screening for diabetes mellitus    7. Need for influenza vaccination    8. Tobacco use disorder        Plan:   1. Routine general medical examination at a health care facility    2. Essential hypertension  Assessment & Plan:  /94, start metoprolol, apparently patient did not start this medication at last visit.  Schedule nurse visit in 2 weeks for BP recheck    Orders:  -     metoprolol succinate (TOPROL-XL) 25 MG 24 hr tablet; Take 1 tablet (25 mg total) by mouth once daily.  Dispense: 30 tablet; Refill: 11    3. Mixed hyperlipidemia  Assessment & Plan:  Status pending lab, encouraged to start fish oil daily, lifestyle and diet changes encouraged    Orders:  -     Comprehensive Metabolic Panel; Future; Expected date: 04/03/2024  -     Lipid Panel; Future; Expected date: 04/03/2024    4. Chronic insomnia  Assessment & Plan:  Consider trazodone in the future      5. Cigarette nicotine dependence without complication  -     buPROPion (WELLBUTRIN SR) 150 MG TBSR 12 hr tablet; TAKE 1 TABLET BY MOUTH EVERY DAY FOR 7 DAYS THEN TAKE 1 TABLET BY MOUTH TWICE DAILY  Dispense: 60 tablet; Refill: 5    6. Screening for  diabetes mellitus  -     Hemoglobin A1C; Future; Expected date: 04/03/2024    7. Need for influenza vaccination    8. Tobacco use disorder  Assessment & Plan:  Patient requesting to restart Wellbutrin, continue nicotine patches      Other orders  -     Influenza - Quadrivalent (PF)        Flu shot today  6 month f/u with Dr. Lynn scheduled with fasting labs PTA   Health Maintenance reviewed/updated.

## 2023-12-21 ENCOUNTER — ON-DEMAND VIRTUAL (OUTPATIENT)
Dept: URGENT CARE | Facility: CLINIC | Age: 43
End: 2023-12-21
Payer: COMMERCIAL

## 2023-12-21 DIAGNOSIS — B34.9 VIRAL ILLNESS: Primary | ICD-10-CM

## 2023-12-21 PROCEDURE — 99202 PR OFFICE/OUTPT VISIT, NEW, LEVL II, 15-29 MIN: ICD-10-PCS | Mod: 95,,, | Performed by: NURSE PRACTITIONER

## 2023-12-21 PROCEDURE — 99202 OFFICE O/P NEW SF 15 MIN: CPT | Mod: 95,,, | Performed by: NURSE PRACTITIONER

## 2023-12-21 NOTE — PROGRESS NOTES
Subjective:      Patient ID: Gunnar Galindo is a 43 y.o. male.    Vitals:  vitals were not taken for this visit.     Chief Complaint: URI      Visit Type: TELE AUDIOVISUAL    Present with the patient at the time of consultation: TELEMED PRESENT WITH PATIENT: None    Past Medical History:   Diagnosis Date    Hypertension      Past Surgical History:   Procedure Laterality Date    APPENDECTOMY      EYE SURGERY      eye sx  05/2020    FRACTURE SURGERY      KNEE SURGERY  1999    wrist sx  2015     Review of patient's allergies indicates:  No Known Allergies  Current Outpatient Medications on File Prior to Visit   Medication Sig Dispense Refill    buPROPion (WELLBUTRIN SR) 150 MG TBSR 12 hr tablet TAKE 1 TABLET BY MOUTH EVERY DAY FOR 7 DAYS THEN TAKE 1 TABLET BY MOUTH TWICE DAILY 60 tablet 5    ibuprofen (ADVIL,MOTRIN) 800 MG tablet TK 1 T PO  Q 6 TO 8 H PRN P      metoprolol succinate (TOPROL-XL) 25 MG 24 hr tablet Take 1 tablet (25 mg total) by mouth once daily. 30 tablet 11    nicotine polacrilex (NICORETTE) 4 MG Gum Take 1 each (4 mg total) by mouth as needed (Use 5-6 pieces of gum per day in the place of cigarettes). 100 each 2    zolpidem (AMBIEN) 10 mg Tab TK 1 T PO Q NIGHT PRF SLP       No current facility-administered medications on file prior to visit.     Family History   Problem Relation Age of Onset    Diabetes Paternal Grandmother     Cancer Paternal Grandfather         Prostate cancer           Ohs Peq Odvv Intake    12/21/2023  6:58 AM CST - Filed by Patient   Describe your reason for todays visit    What is your current physical address in the event of a medical emergency? [id number]   Are you able to take your vital signs? No   Please attach any relevant images or files          Flu-like symptoms for 3 days. Fever(100.5),  congestion and cough. No known exposures. No COVID testing. Taking OTC meds with little relief.    URI   Associated symptoms include congestion and coughing. Pertinent  negatives include no diarrhea, ear pain, headaches, nausea, sore throat, vomiting or wheezing.       Constitution: Positive for chills and fever.   HENT:  Positive for congestion. Negative for ear pain and sore throat.    Neck: Negative for painful lymph nodes.   Respiratory:  Positive for cough. Negative for shortness of breath and wheezing.    Gastrointestinal:  Negative for nausea, vomiting and diarrhea.   Musculoskeletal:  Positive for muscle ache.   Neurological:  Negative for headaches.   Hematologic/Lymphatic: Negative for swollen lymph nodes.        Objective:   The physical exam was conducted virtually.  Physical Exam   Constitutional: He is oriented to person, place, and time. He does not appear ill. No distress.   HENT:   Head: Normocephalic and atraumatic.   Nose: Nose normal.   Eyes: Extraocular movement intact   Pulmonary/Chest: Effort normal.   Abdominal: Normal appearance.   Musculoskeletal: Normal range of motion.         General: Normal range of motion.   Neurological: no focal deficit. He is alert and oriented to person, place, and time.   Psychiatric: His behavior is normal. Mood normal.   Vitals reviewed.      Assessment:     1. Viral illness        Plan:   Patient encouraged to monitor symptoms closely and instructed to follow-up for new or worsening symptoms. Further, in-person, evaluation may be necessary for continued treatment. Please follow up with your primary care doctor or specialist as needed. Verbally discussed plan. Patient confirms understanding and is in agreement with treatment and plan.     You must understand that you've received a Virtual Care evaluation only and that you may be released before all your medical problems are known or treated. You, the patient, will arrange for follow up care as instructed.      Viral illness      Patient Instructions   OVER THE COUNTER RECOMMENDATIONS/SUGGESTIONS (IF NO CONTRAINDICATIONS).     ·         Make sure to stay well hydrated.     ·          Use Nasal Saline to mechanically move any post nasal drip from your eustachian tube or from the back of your throat.     ·         Use warm saltwater gargles to ease your throat pain. Warm saltwater gargles as needed for sore throat-  1/2 tsp salt to 1 cup warm water, gargle as desired. Warm fluids tend to relieve a sore throat.     .         Throat lozenges, Chloraseptic spray or other over the counter treatments are ok to use as well. Use as directed.     ·         Use an antihistamine such as Claritin, Zyrtec or Allegra to dry you out.     ·         Use pseudoephedrine (behind the counter) to decongest. Pseudoephedrine  30 mg up to 240 mg /day. It can raise your blood pressure and give you palpitations.     ·         Use Mucinex (guaifenesin) to break up mucous up to 2400mg/day to loosen any mucous.     ·         The Mucinex DM pill has a cough suppressant that can be sedating. It can be used at night to stop the tickle at the back of your throat.     ·         You can use Mucinex D (it has guaifenesin and a high dose of pseudoephedrine) in the mornings to help decongest.     ·         Use Afrin (oxymetazoline) in each nare for no longer than 3 days, as it is addictive. It can also dry out your mucous membranes and cause elevated blood pressure. This is especially useful if you are flying.     ·         Use Flonase 1-2 sprays/nostril per day. It is a local acting steroid nasal spray, if you develop a bloody nose, stop using the medication immediately.     ·         Sometimes Nyquil at night is beneficial to help you get some rest, however it is sedating, and it does have an antihistamine, and Tylenol.     ·         Honey is a natural cough suppressant that can be used.     ·         Tylenol up to 4,000 mg a day is safe for short periods and can be used for body aches, pain, and fever. However, in high doses and prolonged use it can cause liver irritation.     ·         Ibuprofen is a non-steroidal  anti-inflammatory that can be used for body aches, pain, and fever. However, it can also cause stomach irritation if overused.

## 2023-12-21 NOTE — LETTER
December 21, 2023    Gunnar Galindo  [address]  [place] [address]             Virtual Visit - Urgent Care  Urgent Care  [address] [facility] [id number]   December 21, 2023     Patient: Gunnar Galindo   YOB: 1980   Date of Visit: 12/21/2023       To Whom it May Concern:    Gunnar Galindo was seen virtually on 12/21/2023. He  may return to work once symptoms have improved and he has been fever free for 24 hours without taking fever reducing medications.    Please excuse him from any classes or work missed.    If you have any questions or concerns, please don't hesitate to call.    Sincerely,         Lucy Aburto, NP

## 2024-01-23 ENCOUNTER — TELEPHONE (OUTPATIENT)
Dept: SMOKING CESSATION | Facility: CLINIC | Age: 44
End: 2024-01-23
Payer: COMMERCIAL

## 2024-04-05 ENCOUNTER — LAB VISIT (OUTPATIENT)
Dept: LAB | Facility: HOSPITAL | Age: 44
End: 2024-04-05
Attending: PHYSICIAN ASSISTANT
Payer: COMMERCIAL

## 2024-04-05 DIAGNOSIS — E78.2 MIXED HYPERLIPIDEMIA: ICD-10-CM

## 2024-04-05 DIAGNOSIS — Z13.1 SCREENING FOR DIABETES MELLITUS: ICD-10-CM

## 2024-04-05 LAB
ALBUMIN SERPL BCP-MCNC: 4 G/DL (ref 3.5–5.2)
ALP SERPL-CCNC: 82 U/L (ref 55–135)
ALT SERPL W/O P-5'-P-CCNC: 45 U/L (ref 10–44)
ANION GAP SERPL CALC-SCNC: 9 MMOL/L (ref 8–16)
AST SERPL-CCNC: 24 U/L (ref 10–40)
BILIRUB SERPL-MCNC: 0.3 MG/DL (ref 0.1–1)
BUN SERPL-MCNC: 13 MG/DL (ref 6–20)
CALCIUM SERPL-MCNC: 10.2 MG/DL (ref 8.7–10.5)
CHLORIDE SERPL-SCNC: 106 MMOL/L (ref 95–110)
CHOLEST SERPL-MCNC: 201 MG/DL (ref 120–199)
CHOLEST/HDLC SERPL: 6.3 {RATIO} (ref 2–5)
CO2 SERPL-SCNC: 25 MMOL/L (ref 23–29)
CREAT SERPL-MCNC: 0.8 MG/DL (ref 0.5–1.4)
EST. GFR  (NO RACE VARIABLE): >60 ML/MIN/1.73 M^2
ESTIMATED AVG GLUCOSE: 108 MG/DL (ref 68–131)
GLUCOSE SERPL-MCNC: 93 MG/DL (ref 70–110)
HBA1C MFR BLD: 5.4 % (ref 4–5.6)
HDLC SERPL-MCNC: 32 MG/DL (ref 40–75)
HDLC SERPL: 15.9 % (ref 20–50)
LDLC SERPL CALC-MCNC: 124.6 MG/DL (ref 63–159)
NONHDLC SERPL-MCNC: 169 MG/DL
POTASSIUM SERPL-SCNC: 5 MMOL/L (ref 3.5–5.1)
PROT SERPL-MCNC: 6.9 G/DL (ref 6–8.4)
SODIUM SERPL-SCNC: 140 MMOL/L (ref 136–145)
TRIGL SERPL-MCNC: 222 MG/DL (ref 30–150)

## 2024-04-05 PROCEDURE — 36415 COLL VENOUS BLD VENIPUNCTURE: CPT | Performed by: PHYSICIAN ASSISTANT

## 2024-04-05 PROCEDURE — 83036 HEMOGLOBIN GLYCOSYLATED A1C: CPT | Performed by: PHYSICIAN ASSISTANT

## 2024-04-05 PROCEDURE — 80053 COMPREHEN METABOLIC PANEL: CPT | Performed by: PHYSICIAN ASSISTANT

## 2024-04-05 PROCEDURE — 80061 LIPID PANEL: CPT | Performed by: PHYSICIAN ASSISTANT

## 2024-04-10 ENCOUNTER — OFFICE VISIT (OUTPATIENT)
Dept: INTERNAL MEDICINE | Facility: CLINIC | Age: 44
End: 2024-04-10
Payer: COMMERCIAL

## 2024-04-10 VITALS — DIASTOLIC BLOOD PRESSURE: 90 MMHG | SYSTOLIC BLOOD PRESSURE: 140 MMHG

## 2024-04-10 DIAGNOSIS — I10 ESSENTIAL HYPERTENSION: Primary | ICD-10-CM

## 2024-04-10 DIAGNOSIS — Z12.11 COLON CANCER SCREENING: ICD-10-CM

## 2024-04-10 DIAGNOSIS — F51.04 CHRONIC INSOMNIA: ICD-10-CM

## 2024-04-10 DIAGNOSIS — E78.2 MIXED HYPERLIPIDEMIA: ICD-10-CM

## 2024-04-10 DIAGNOSIS — R19.7 DIARRHEA, UNSPECIFIED TYPE: ICD-10-CM

## 2024-04-10 DIAGNOSIS — F17.200 TOBACCO USE DISORDER: ICD-10-CM

## 2024-04-10 PROCEDURE — 3044F HG A1C LEVEL LT 7.0%: CPT | Mod: CPTII,95,, | Performed by: PHYSICIAN ASSISTANT

## 2024-04-10 PROCEDURE — 3077F SYST BP >= 140 MM HG: CPT | Mod: CPTII,95,, | Performed by: PHYSICIAN ASSISTANT

## 2024-04-10 PROCEDURE — 1160F RVW MEDS BY RX/DR IN RCRD: CPT | Mod: CPTII,95,, | Performed by: PHYSICIAN ASSISTANT

## 2024-04-10 PROCEDURE — 3080F DIAST BP >= 90 MM HG: CPT | Mod: CPTII,95,, | Performed by: PHYSICIAN ASSISTANT

## 2024-04-10 PROCEDURE — 99214 OFFICE O/P EST MOD 30 MIN: CPT | Mod: 95,,, | Performed by: PHYSICIAN ASSISTANT

## 2024-04-10 PROCEDURE — 1159F MED LIST DOCD IN RCRD: CPT | Mod: CPTII,95,, | Performed by: PHYSICIAN ASSISTANT

## 2024-04-10 RX ORDER — ATORVASTATIN CALCIUM 10 MG/1
10 TABLET, FILM COATED ORAL DAILY
Qty: 30 TABLET | Refills: 11 | Status: SHIPPED | OUTPATIENT
Start: 2024-04-10 | End: 2025-04-10

## 2024-04-10 RX ORDER — MIRTAZAPINE 7.5 MG/1
7.5 TABLET, FILM COATED ORAL NIGHTLY
Qty: 30 TABLET | Refills: 5 | Status: SHIPPED | OUTPATIENT
Start: 2024-04-10 | End: 2024-04-15 | Stop reason: SDUPTHER

## 2024-04-10 NOTE — PROGRESS NOTES
Subjective:       Patient ID: Gunnar Galindo is a 43 y.o. male.    Chief Complaint: No chief complaint on file.      The patient location is: [place]  The chief complaint leading to consultation is: 6 month f/u    Visit type: audiovisual    Face to Face time with patient: 21 minutes (chart review started 1639; video started 1639; video ended 1700)  25 minutes of total time spent on the encounter, which includes face to face time and non-face to face time preparing to see the patient (eg, review of tests), Obtaining and/or reviewing separately obtained history, Documenting clinical information in the electronic or other health record, Independently interpreting results (not separately reported) and communicating results to the patient/family/caregiver, or Care coordination (not separately reported).     Each patient to whom he or she provides medical services by telemedicine is:  (1) informed of the relationship between the physician and patient and the respective role of any other health care provider with respect to management of the patient; and (2) notified that he or she may decline to receive medical services by telemedicine and may withdraw from such care at any time.    Patient presents virtually for six-month follow-up.      Review of Systems   Constitutional:  Negative for activity change and unexpected weight change.   HENT:  Negative for hearing loss, rhinorrhea and trouble swallowing.    Eyes:  Negative for discharge and visual disturbance.   Respiratory:  Negative for chest tightness and wheezing.    Cardiovascular:  Negative for chest pain and palpitations.   Gastrointestinal:  Positive for diarrhea. Negative for blood in stool, constipation and vomiting.   Endocrine: Negative for polydipsia and polyuria.   Genitourinary:  Negative for difficulty urinating, hematuria and urgency.   Musculoskeletal:  Negative for arthralgias, joint swelling and neck pain.   Neurological:  Negative for weakness  and headaches.   Psychiatric/Behavioral:  Negative for confusion and dysphoric mood.          Objective:        Wt Readings from Last 3 Encounters:   10/03/23 111.7 kg (246 lb 4.1 oz)   01/03/23 107.6 kg (237 lb 5.2 oz)   10/17/20 102.1 kg (225 lb)     Temp Readings from Last 3 Encounters:   10/03/23 97.2 °F (36.2 °C) (Tympanic)   01/03/23 99 °F (37.2 °C) (Temporal)   10/17/20 97.6 °F (36.4 °C) (Temporal)     BP Readings from Last 3 Encounters:   04/10/24 (!) 140/90   10/03/23 (!) 142/94   01/03/23 (!) 148/86     Pulse Readings from Last 3 Encounters:   10/03/23 92   01/03/23 104   10/17/20 69     There is no height or weight on file to calculate BMI.    Physical Exam  Constitutional:       General: He is not in acute distress.     Appearance: He is well-developed.   HENT:      Head: Normocephalic and atraumatic.   Eyes:      General: Lids are normal. No scleral icterus.     Extraocular Movements: Extraocular movements intact.      Conjunctiva/sclera: Conjunctivae normal.   Pulmonary:      Effort: Pulmonary effort is normal.   Neurological:      Mental Status: He is alert.   Psychiatric:         Mood and Affect: Mood and affect normal.         Assessment:       1. Essential hypertension    2. Mixed hyperlipidemia    3. Colon cancer screening    4. Diarrhea, unspecified type    5. Chronic insomnia    6. Tobacco use disorder        Plan:   1. Essential hypertension  Assessment & Plan:  BP slightly elevated, taking metoprolol intermittently, expressed importance of taking medication daily, continue to monitor BP at home  Lab Results   Component Value Date     04/05/2024    K 5.0 04/05/2024    BUN 13 04/05/2024    CREATININE 0.8 04/05/2024    EGFRNORACEVR >60.0 04/05/2024        Orders:  -     Comprehensive Metabolic Panel; Future; Expected date: 10/10/2024  -     Lipid Panel; Future; Expected date: 10/10/2024    2. Mixed hyperlipidemia  Assessment & Plan:  ASCVD risk >10%, diet and lifestyle changes discussed and  encouraged, start atorvastatin 10mg, s/e discussed, recheck labs in 3 months  Lab Results   Component Value Date    CHOL 201 (H) 04/05/2024    LDLCALC 124.6 04/05/2024    TRIG 222 (H) 04/05/2024    HDL 32 (L) 04/05/2024    ALT 45 (H) 04/05/2024    AST 24 04/05/2024    ALKPHOS 82 04/05/2024        Orders:  -     atorvastatin (LIPITOR) 10 MG tablet; Take 1 tablet (10 mg total) by mouth once daily.  Dispense: 30 tablet; Refill: 11  -     Comprehensive Metabolic Panel; Future; Expected date: 07/10/2024  -     Lipid Panel; Future; Expected date: 07/10/2024  -     TSH; Future; Expected date: 10/10/2024  -     CBC Auto Differential; Future; Expected date: 10/10/2024    3. Colon cancer screening  -     Ambulatory referral/consult to Endo Procedure     4. Diarrhea, unspecified type  -     Stool culture; Future; Expected date: 04/10/2024  -     Clostridium difficile EIA; Future; Expected date: 04/10/2024  -     Giardia / Cryptosporidum, EIA; Future; Expected date: 04/10/2024  -     H. pylori antigen, stool; Future; Expected date: 04/10/2024  -     Occult blood x 1, stool; Future; Expected date: 04/10/2024  -     WBC, Stool; Future; Expected date: 04/10/2024    5. Chronic insomnia  Assessment & Plan:  Stop ambien (not refilling 10mg dosage), start Remeron, will send message if medication helps or not in a few days.    Orders:  -     mirtazapine (REMERON) 7.5 MG Tab; Take 1 tablet (7.5 mg total) by mouth every evening.  Dispense: 30 tablet; Refill: 5    6. Tobacco use disorder  Assessment & Plan:  Down from 1PPD to 1/4PPD, intermittently takes wellbutrin, encouraged taking daily for best effect.        Recent labs reviewed with patient.     C-scope ordered  Annual with Dr. Lynn scheduled with fasting labs PTA     The 10-year ASCVD risk score (Ange DK, et al., 2019) is: 12.7%    Values used to calculate the score:      Age: 43 years      Sex: Male      Is Non- : No      Diabetic: No       Tobacco smoker: Yes      Systolic Blood Pressure: 140 mmHg      Is BP treated: Yes      HDL Cholesterol: 32 mg/dL      Total Cholesterol: 201 mg/dL

## 2024-04-11 NOTE — ASSESSMENT & PLAN NOTE
ASCVD risk >10%, diet and lifestyle changes discussed and encouraged, start atorvastatin 10mg, s/e discussed, recheck labs in 3 months  Lab Results   Component Value Date    CHOL 201 (H) 04/05/2024    LDLCALC 124.6 04/05/2024    TRIG 222 (H) 04/05/2024    HDL 32 (L) 04/05/2024    ALT 45 (H) 04/05/2024    AST 24 04/05/2024    ALKPHOS 82 04/05/2024

## 2024-04-11 NOTE — ASSESSMENT & PLAN NOTE
Down from 1PPD to 1/4PPD, intermittently takes wellbutrin, encouraged taking daily for best effect.

## 2024-04-11 NOTE — ASSESSMENT & PLAN NOTE
Stop ambien (not refilling 10mg dosage), start Remeron, will send message if medication helps or not in a few days.

## 2024-04-11 NOTE — ASSESSMENT & PLAN NOTE
BP slightly elevated, taking metoprolol intermittently, expressed importance of taking medication daily, continue to monitor BP at home  Lab Results   Component Value Date     04/05/2024    K 5.0 04/05/2024    BUN 13 04/05/2024    CREATININE 0.8 04/05/2024    EGFRNORACEVR >60.0 04/05/2024

## 2024-04-12 ENCOUNTER — HOSPITAL ENCOUNTER (OUTPATIENT)
Dept: PREADMISSION TESTING | Facility: HOSPITAL | Age: 44
Discharge: HOME | End: 2024-04-12
Attending: INTERNAL MEDICINE
Payer: COMMERCIAL

## 2024-04-12 DIAGNOSIS — Z12.11 COLON CANCER SCREENING: Primary | ICD-10-CM

## 2024-04-14 ENCOUNTER — PATIENT MESSAGE (OUTPATIENT)
Dept: INTERNAL MEDICINE | Facility: CLINIC | Age: 44
End: 2024-04-14
Payer: COMMERCIAL

## 2024-04-14 DIAGNOSIS — F51.04 CHRONIC INSOMNIA: ICD-10-CM

## 2024-04-15 RX ORDER — MIRTAZAPINE 30 MG/1
30 TABLET, FILM COATED ORAL NIGHTLY
Qty: 30 TABLET | Refills: 5 | Status: SHIPPED | OUTPATIENT
Start: 2024-04-15 | End: 2024-10-12

## 2024-04-18 ENCOUNTER — PATIENT MESSAGE (OUTPATIENT)
Dept: GASTROENTEROLOGY | Facility: CLINIC | Age: 44
End: 2024-04-18
Payer: COMMERCIAL

## 2024-04-18 PROBLEM — Z12.11 SPECIAL SCREENING FOR MALIGNANT NEOPLASMS, COLON: Status: ACTIVE | Noted: 2024-04-18

## 2024-04-19 ENCOUNTER — HOSPITAL ENCOUNTER (OUTPATIENT)
Facility: HOSPITAL | Age: 44
Discharge: HOME | End: 2024-04-19
Attending: INTERNAL MEDICINE | Admitting: INTERNAL MEDICINE
Payer: COMMERCIAL

## 2024-04-19 ENCOUNTER — ANESTHESIA EVENT (OUTPATIENT)
Dept: ENDOSCOPY | Facility: HOSPITAL | Age: 44
End: 2024-04-19
Payer: COMMERCIAL

## 2024-04-19 ENCOUNTER — PATIENT MESSAGE (OUTPATIENT)
Dept: GASTROENTEROLOGY | Facility: HOSPITAL | Age: 44
End: 2024-04-19
Payer: COMMERCIAL

## 2024-04-19 ENCOUNTER — ANESTHESIA (OUTPATIENT)
Dept: ENDOSCOPY | Facility: HOSPITAL | Age: 44
End: 2024-04-19
Payer: COMMERCIAL

## 2024-04-19 ENCOUNTER — PATIENT MESSAGE (OUTPATIENT)
Dept: GASTROENTEROLOGY | Facility: CLINIC | Age: 44
End: 2024-04-19

## 2024-04-19 DIAGNOSIS — K52.9 CHRONIC DIARRHEA: Primary | ICD-10-CM

## 2024-04-19 DIAGNOSIS — K52.9 ILEITIS: ICD-10-CM

## 2024-04-19 DIAGNOSIS — R93.3 ABNORMAL COLONOSCOPY: ICD-10-CM

## 2024-04-19 DIAGNOSIS — K50.00 TERMINAL ILEITIS WITHOUT COMPLICATION: ICD-10-CM

## 2024-04-19 DIAGNOSIS — Z12.11 SPECIAL SCREENING FOR MALIGNANT NEOPLASMS, COLON: Primary | ICD-10-CM

## 2024-04-19 PROCEDURE — 88305 TISSUE EXAM BY PATHOLOGIST: CPT | Performed by: PATHOLOGY

## 2024-04-19 PROCEDURE — 45380 COLONOSCOPY AND BIOPSY: CPT | Mod: 33,,, | Performed by: INTERNAL MEDICINE

## 2024-04-19 PROCEDURE — 37000008 HC ANESTHESIA 1ST 15 MINUTES: Performed by: INTERNAL MEDICINE

## 2024-04-19 PROCEDURE — 27201012 HC FORCEPS, HOT/COLD, DISP: Performed by: INTERNAL MEDICINE

## 2024-04-19 PROCEDURE — 63600175 PHARM REV CODE 636 W HCPCS: Performed by: NURSE ANESTHETIST, CERTIFIED REGISTERED

## 2024-04-19 PROCEDURE — 45380 COLONOSCOPY AND BIOPSY: CPT | Mod: PT | Performed by: INTERNAL MEDICINE

## 2024-04-19 PROCEDURE — 25000003 PHARM REV CODE 250: Performed by: INTERNAL MEDICINE

## 2024-04-19 PROCEDURE — 88305 TISSUE EXAM BY PATHOLOGIST: CPT | Mod: 26,,, | Performed by: PATHOLOGY

## 2024-04-19 PROCEDURE — 37000009 HC ANESTHESIA EA ADD 15 MINS: Performed by: INTERNAL MEDICINE

## 2024-04-19 PROCEDURE — 25000003 PHARM REV CODE 250: Performed by: NURSE ANESTHETIST, CERTIFIED REGISTERED

## 2024-04-19 RX ORDER — PROPOFOL 10 MG/ML
VIAL (ML) INTRAVENOUS
Status: DISCONTINUED | OUTPATIENT
Start: 2024-04-19 | End: 2024-04-19

## 2024-04-19 RX ORDER — SODIUM CHLORIDE, SODIUM LACTATE, POTASSIUM CHLORIDE, CALCIUM CHLORIDE 600; 310; 30; 20 MG/100ML; MG/100ML; MG/100ML; MG/100ML
INJECTION, SOLUTION INTRAVENOUS CONTINUOUS PRN
Status: DISCONTINUED | OUTPATIENT
Start: 2024-04-19 | End: 2024-04-19

## 2024-04-19 RX ORDER — SODIUM CHLORIDE, SODIUM LACTATE, POTASSIUM CHLORIDE, CALCIUM CHLORIDE 600; 310; 30; 20 MG/100ML; MG/100ML; MG/100ML; MG/100ML
INJECTION, SOLUTION INTRAVENOUS CONTINUOUS
Status: DISCONTINUED | OUTPATIENT
Start: 2024-04-19 | End: 2024-04-19 | Stop reason: HOSPADM

## 2024-04-19 RX ORDER — SODIUM CHLORIDE 0.9 % (FLUSH) 0.9 %
2 SYRINGE (ML) INJECTION
Status: DISCONTINUED | OUTPATIENT
Start: 2024-04-19 | End: 2024-04-19 | Stop reason: HOSPADM

## 2024-04-19 RX ORDER — DEXTROMETHORPHAN/PSEUDOEPHED 2.5-7.5/.8
DROPS ORAL
Status: DISCONTINUED | OUTPATIENT
Start: 2024-04-19 | End: 2024-04-19 | Stop reason: HOSPADM

## 2024-04-19 RX ORDER — LIDOCAINE HYDROCHLORIDE 10 MG/ML
INJECTION, SOLUTION EPIDURAL; INFILTRATION; INTRACAUDAL; PERINEURAL
Status: DISCONTINUED | OUTPATIENT
Start: 2024-04-19 | End: 2024-04-19

## 2024-04-19 RX ADMIN — LIDOCAINE HYDROCHLORIDE 50 MG: 10 SOLUTION INTRAVENOUS at 09:04

## 2024-04-19 RX ADMIN — PROPOFOL 30 MG: 10 INJECTION, EMULSION INTRAVENOUS at 09:04

## 2024-04-19 RX ADMIN — PROPOFOL 100 MG: 10 INJECTION, EMULSION INTRAVENOUS at 09:04

## 2024-04-19 RX ADMIN — PROPOFOL 50 MG: 10 INJECTION, EMULSION INTRAVENOUS at 09:04

## 2024-04-19 RX ADMIN — SODIUM CHLORIDE, SODIUM LACTATE, POTASSIUM CHLORIDE, AND CALCIUM CHLORIDE: 600; 310; 30; 20 INJECTION, SOLUTION INTRAVENOUS at 09:04

## 2024-04-19 NOTE — DISCHARGE INSTRUCTIONS
Dear Gunnar Galindo      If you develop fevers, severe abdominal pain, significant bleeding, nausea or vomiting, please contact us or come to our Emergency Department at [facility].  Our  contact number is [phone] available for emergencies or any question that you may have.      You may return to normal activities tomorrow.  Written discharge instructions were provided to you today and have them handy since you may not remember our conversation today.       If you take Aspirin, please resume taking it at your prior dose today. If we obtained biopsies or removed polyps during your procedure, we will contact you within 5 to 6 days with the results.      Thanks for trusting us with your healthcare needs.      Sincerely,     Daniel Kimbrough M.D.        To rate your experience with Dr. Kimbrough, please click on the link below     [email or web address]

## 2024-04-19 NOTE — BRIEF OP NOTE
Endoscopy Discharge Summary      Admit Date: 4/19/2024    Discharge Date and Time:  4/19/2024 10:11 AM    Attending Physician: Kelvin Matias MD     Discharge Physician: Kelvin Matias MD     Principal Admitting Diagnoses: Special screening for malignant neoplasms, colon         Discharge Diagnosis: The primary encounter diagnosis was Special screening for malignant neoplasms, colon. A diagnosis of Terminal ileitis without complication was also pertinent to this visit.     Discharged Condition: Good    Indication for Admission: Special screening for malignant neoplasms, colon     Hospital Course: Patient was admitted for an inpatient procedure and tolerated the procedure well with no complications.    Significant Diagnostic Studies:  COLON with biopsies.   1. Special screening for malignant neoplasms, colon    2. Terminal ileitis without complication          Pathology (if any):  Specimen (24h ago, onward)       Start     Ordered    04/19/24 0955  Specimen to Pathology, Surgery Gastrointestinal tract  Once        Comments: Pre-op Diagnosis: Colon cancer screening [Z12.11]Procedure(s):COLONOSCOPY 1. Terminal ilium inflammation bxs2. Random colon bxs     References:    Click here for ordering Quick Tip   Question Answer Comment   Procedure Type: Gastrointestinal tract    Which provider would you like to cc? KELVIN MATIAS    Release to patient Immediate        04/19/24 1001                    Disposition: Home.    Bleeding: None    No Implants         Follow Up/Patient Instructions:   Current Discharge Medication List        CONTINUE these medications which have NOT CHANGED    Details   atorvastatin (LIPITOR) 10 MG tablet Take 1 tablet (10 mg total) by mouth once daily.  Qty: 30 tablet, Refills: 11    Associated Diagnoses: Mixed hyperlipidemia      metoprolol succinate (TOPROL-XL) 25 MG 24 hr tablet Take 1 tablet (25 mg total) by mouth once daily.  Qty: 30 tablet, Refills: 11    Comments: .  Associated Diagnoses:  Essential hypertension      mirtazapine (REMERON) 30 MG tablet Take 1 tablet (30 mg total) by mouth every evening.  Qty: 30 tablet, Refills: 5    Associated Diagnoses: Chronic insomnia      buPROPion (WELLBUTRIN SR) 150 MG TBSR 12 hr tablet TAKE 1 TABLET BY MOUTH EVERY DAY FOR 7 DAYS THEN TAKE 1 TABLET BY MOUTH TWICE DAILY  Qty: 60 tablet, Refills: 5    Associated Diagnoses: Cigarette nicotine dependence without complication      ibuprofen (ADVIL,MOTRIN) 800 MG tablet TK 1 T PO  Q 6 TO 8 H PRN P             Discharge Procedure Orders   Diet general     No dressing needed     Call MD for:  temperature >100.4     Call MD for:  persistent nausea and vomiting     Call MD for:  severe uncontrolled pain     Call MD for:  difficulty breathing, headache or visual disturbances     Call MD for:  redness, tenderness, or signs of infection (pain, swelling, redness, odor or green/yellow discharge around incision site)     Call MD for:  hives     Call MD for:  persistent dizziness or light-headedness        Follow-up Information       Charlotte Lynn MD Follow up.    Specialty: Family Medicine  Why: As needed  Contact information:  [address] [place] [address]  [phone]               Danita Varela NP. Schedule an appointment as soon as possible for a visit in 1 week(s).    Specialty: Gastroenterology  Contact information:  [address] [place] [address] [phone]

## 2024-04-19 NOTE — ANESTHESIA PREPROCEDURE EVALUATION
04/19/2024  Gunnar Galindo is a 43 y.o., male.      Pre-op Assessment    I have reviewed the Patient Summary Reports.     I have reviewed the Nursing Notes. I have reviewed the NPO Status.   I have reviewed the Medications.     Review of Systems  Anesthesia Hx:  No problems with previous Anesthesia             Denies Family Hx of Anesthesia complications.    Denies Personal Hx of Anesthesia complications.                    Social:  Former Smoker       Hematology/Oncology:  Hematology Normal   Oncology Normal                                   EENT/Dental:  EENT/Dental Normal           Cardiovascular:     Hypertension              ECG has been reviewed.                          Pulmonary:  Pulmonary Normal                       Renal/:  Renal/ Normal                 Hepatic/GI:  Hepatic/GI Normal                 Musculoskeletal:  Musculoskeletal Normal                Neurological:  Neurology Normal                                      Endocrine:  Endocrine Normal            Dermatological:  Skin Normal    Psych:  Psychiatric Normal                    Physical Exam  General: Well nourished, Cooperative, Alert and Oriented    Airway:  Mallampati: II   Mouth Opening: Normal  TM Distance: Normal  Tongue: Normal  Neck ROM: Normal ROM    Dental:  Intact  Pt denies loose or removable dentition  Heart:  Rate: Normal  Rhythm: Regular Rhythm        Anesthesia Plan  Type of Anesthesia, risks & benefits discussed:    Anesthesia Type: MAC, Gen Natural Airway  Intra-op Monitoring Plan: Standard ASA Monitors  Post Op Pain Control Plan: multimodal analgesia  Induction:  IV  Informed Consent: Informed consent signed with the Patient and all parties understand the risks and agree with anesthesia plan.  All questions answered.   ASA Score: 2  Day of Surgery Review of History & Physical: H&P Update referred to the  surgeon/provider.I have interviewed and examined the patient. I have reviewed the patient's H&P dated: There are no significant changes.     Ready For Surgery From Anesthesia Perspective.     .

## 2024-04-19 NOTE — [employer] PATIENT INSTRUCTIONS
Discharge Summary/Instructions after an Endoscopic Procedure  Patient Name: Gunnar Galindo  Patient MRN: [id number]  Patient YOB: 1980 Friday, April 19, 2024 Daniel Kimbrough MD  Dear patient,  As a result of recent federal legislation (The Federal Cures Act), you may   receive lab or pathology results from your procedure in your [facility]   account before your physician is able to contact you. Your physician or   their representative will relay the results to you with their   recommendations at their soonest availability.  Thank you,  RESTRICTIONS:  During your procedure today, you received medications for sedation.  These   medications may affect your judgment, balance and coordination.  Therefore,   for 24 hours, you have the following restrictions:   - DO NOT drive a car, operate machinery, make legal/financial decisions,   sign important papers or drink alcohol.    ACTIVITY:  Today: no heavy lifting, straining or running due to procedural   sedation/anesthesia.  The following day: return to full activity including work.  DIET:  Eat and drink normally unless instructed otherwise.     TREATMENT FOR COMMON SIDE EFFECTS:  - Mild abdominal pain, nausea, belching, bloating or excessive gas:  rest,   eat lightly and use a heating pad.  - Sore Throat: treat with throat lozenges and/or gargle with warm salt   water.  - Because air was used during the procedure, expelling large amounts of air   from your rectum or belching is normal.  - If a bowel prep was taken, you may not have a bowel movement for 1-3 days.    This is normal.  SYMPTOMS TO WATCH FOR AND REPORT TO YOUR PHYSICIAN:  1. Abdominal pain or bloating, other than gas cramps.  2. Chest pain.  3. Back pain.  4. Signs of infection such as: chills or fever occurring within 24 hours   after the procedure.  5. Rectal bleeding, which would show as bright red, maroon, or black stools.   (A tablespoon of blood from the rectum is not serious, especially  if   hemorrhoids are present.)  6. Vomiting.  7. Weakness or dizziness.  GO DIRECTLY TO THE NEAREST EMERGENCY ROOM IF YOU HAVE ANY OF THE FOLLOWING:      Difficulty breathing              Chills and/or fever over 101 F   Persistent vomiting and/or vomiting blood   Severe abdominal pain   Severe chest pain   Black, tarry stools   Bleeding- more than one tablespoon   Any other symptom or condition that you feel may need urgent attention  Your doctor recommends these additional instructions:  If any biopsies were taken, your doctors clinic will contact you in 1 to 2   weeks with any results.  - The patient will be observed post-procedure, until all discharge criteria   are met.   - Discharge patient to home (ambulatory).   - Patient has a contact number available for emergencies.  The signs and   symptoms of potential delayed complications were discussed with the   patient.  Return to normal activities tomorrow.  Written discharge   instructions were provided to the patient.   - Resume previous diet.   - Continue present medications.   - Await pathology results.   - Repeat colonoscopy in 6 months to check healing.   - Return to referring physician as previously scheduled.   - Return to nurse practitioner at appointment to be scheduled.  For questions, problems or results please call your physician Daniel Kimbrough MD at Work:  [phone]  If you have any questions about the above instructions, call the GI   department at [phone] or call the endoscopy unit at [phone]   from 7am until 3 pm.  [facility] [place], EMERGENCY ROOM PHONE NUMBER:   [phone]  IF A COMPLICATION OR EMERGENCY SITUATION ARISES AND YOU ARE UNABLE TO REACH   YOUR PHYSICIAN - GO DIRECTLY TO THE EMERGENCY ROOM.  I have read or have had read to me these discharge instructions for my   procedure and have received a written copy.  I understand these   instructions and will follow-up with my physician if I have any  questions.     __________________________________       _____________________________________  Nurse Signature                                          Patient/Designated   Responsible Party Signature  Daniel Kimbrough MD  4/19/2024 10:08:31 AM  This report has been verified and signed electronically.  Dear patient,  As a result of recent federal legislation (The Federal Cures Act), you may   receive lab or pathology results from your procedure in your [facility]   account before your physician is able to contact you. Your physician or   their representative will relay the results to you with their   recommendations at their soonest availability.  Thank you,  [employer]

## 2024-04-19 NOTE — PLAN OF CARE
Dr. Kimbrough  at bedside discussing findings. VSS. Patient alert. No N/V. No bleeding, no pain. Patient released from unit.

## 2024-04-19 NOTE — ANESTHESIA POSTPROCEDURE EVALUATION
Anesthesia Post Evaluation    Patient: Gunnar Galindo    Procedure(s) Performed: Procedure(s) (LRB):  COLONOSCOPY (N/A)    Final Anesthesia Type: MAC      Patient location during evaluation: PACU  Patient participation: Yes- Able to Participate  Level of consciousness: awake and alert  Post-procedure vital signs: reviewed and stable  Pain management: adequate  Airway patency: patent    PONV status at discharge: No PONV  Anesthetic complications: no      Cardiovascular status: blood pressure returned to baseline and hemodynamically stable  Respiratory status: unassisted, spontaneous ventilation and room air  Hydration status: euvolemic  Follow-up not needed.              Vitals Value Taken Time   /88 04/19/24 1015   Temp 36.5 °C (97.7 °F) 04/19/24 1015   Pulse 68 04/19/24 1015   Resp 17 04/19/24 1015   SpO2 97 % 04/19/24 1015         No case tracking events are documented in the log.      Pain/Fuentes Score: Fuentes Score: 10 (4/19/2024 10:15 AM)           PULMONARY & CRITICAL CARE PROGRESS NOTE     Today's Date: 2/3/2024  Km Dunbar  : 1960  Attending Provider: Janie Wilson*  __________________________________________________________________  Mr. Dunbar is a 62yo M with HTN, CAD, severe PH on 6-8L, RUL adenocarcinoma s/p lobectomy 2019, pancreatic cancer who presents with recurrent syncope, felt to represent progressive severe PH.   Patient seen and examined, diagnostics reviewed.    SUBJECTIVE     No acute events overnight, feels about the same since yesterday, remains on heated high flow    ASSESSMENT     Syncope  Fall with acute on chronic compression fractures  Acute on chronic severe PH on Tyvaso and Revatio  Hyponatremia  MARCO ANTONIO vs CKD  Abnormal LFTs  HTN  CAD  Chronic hypoxemic respiratory failure  on 7LPM at home  RUL adenocarcinoma s/p lobectomy 2019  Pancreatic cancer  Progressive ILD: NSIP vs UIP vs burntout hypersensitivity pneumonitis w underlying COPD  COPD  PFTS out of proportion to actual degree of copd due to restriction     RECOMMENDATIONS     Goal SpO2 90-94%, On HFNC, Titrate supplemental oxygen to maintain SpO2 90-96%  Recommend IS, OOB, and ambulation as tolerated to prevent atelectasis  Check ABG and CXR. Seems volume low. Getting fluids. A Line for BP monitoring   On LAMA/LABA/ICS (Trelegy 100)  On Flexeril and gabapentin; wean off as able  Cardiac and volume optimization per PH team. Post IVF Bolus. RH cath numbers reviewed. Diuretics on hold.   On aspirin and atorvastatin  On sildenafil 20mg PO TID and Tyvaso; appreciate PH team assistance  Avoid nephrotoxins; continue to monitor. Worsening Cr c/w Hypo Vol.   On PPI  SQH for DVT prophylaxis  No indication for GI prophylaxis    This case was discussed personally with ICU team  We will continue to follow with you. Should you have any questions, please do not hesitate to contact me in person.    DILLON TRONCOSO MD  Pulmonary, Critical Care, & Advanced Bronchoscopy    [facility]    PHYSICAL EXAM     GENERAL: Mr. Dunbar is a male in no acute respiratory distress  NECK: Trachea midline no JVD  HEART: Regular rhythm.  No murmurs   LUNGS: Coarse/Diminished breathsounds to auscultation bilaterally, Moderate respiratory effort, No wheezes, +crackles, +rhonchi, Normal work of breathing.   ABDOMEN: Soft, nontender   EXTREMITIES: no LE edema.  NEUROPSYCHIATRIC : Alert and oriented x3. Nonfocal, flat affect     ventilator settings  FiO2 (%):  [50 %-80 %] 70 %   Current Vitals  Vitals:    02/02/24 2000 02/02/24 2100 02/02/24 2200 02/02/24 2300   BP:       BP Location:       Patient Position:       Pulse: 86 96 74 78   Resp: (!) 11 16 (!) 12 15   Temp: 98.1 °F (36.7 °C) 98.1 °F (36.7 °C) 97.7 °F (36.5 °C) 97.7 °F (36.5 °C)   TempSrc:       SpO2: 93% 91% 96% (!) 88%   Weight:       Height:           [unfilled]  SpO2 Readings from Last 1 Encounters:   02/02/24 (!) 88%       Intake/Output Summary (Last 24 hours) at 2/3/2024 0112  Last data filed at 2/2/2024 2300  Gross per 24 hour   Intake 1210 ml   Output 2750 ml   Net -1540 ml       I/O Summary    Intake/Output Summary (Last 24 hours) at 2/3/2024 0112  Last data filed at 2/2/2024 2300  Gross per 24 hour   Intake 1210 ml   Output 2750 ml   Net -1540 ml         MEDICATIONS     Continuous Medications  Current Facility-Administered Medications   Medication Dose Route Frequency Provider Last Rate Last Admin    NORepinephrine (LEVOPHED) 8 mg/250 mL in dextrose 5 % infusion  0-100 mcg/min Intravenous Continuous Ellen Wilson CNP   Completed at 02/01/24 1416    vasopressin (VASOSTRICT) 20 unit/100 mL dextrose 5 % infusion  0.04 Units/min Intravenous Continuous Vira Quezada NP         Scheduled Medications  Current Facility-Administered Medications   Medication Dose Route Frequency Provider Last Rate Last Admin    gabapentin (NEURONTIN) capsule 300 mg  300 mg Oral 3 times per day Pam Jean MD    300 mg at 02/02/24 2057    midodrine (PROAMATINE) tablet 10 mg  10 mg Oral TID AC Pam Jean MD   10 mg at 02/02/24 1658    Treprostinil Powder 64 mcg  64 mcg Inhalation 4x Daily Jerrica Lira APNP   64 mcg at 02/02/24 2058    cyclobenzaprine (FLEXERIL) tablet 5 mg  5 mg Oral 3 times per day Nicanor De La Torre CNP   5 mg at 02/02/24 2056    docusate sodium-sennosides (SENOKOT S) 50-8.6 MG 2 tablet  2 tablet Oral Nightly Nicanor De La Torre CNP   2 tablet at 02/02/24 2057    polyethylene glycol (MIRALAX) packet 17 g  17 g Oral Daily Nicanor De La Torre CNP   17 g at 02/02/24 0841    heparin (porcine) injection 5,000 Units  5,000 Units Subcutaneous 3 times per day Alla Solomon CNP   5,000 Units at 02/02/24 2058    atorvastatin (LIPITOR) tablet 80 mg  80 mg Oral QHS Alla Solomon CNP   80 mg at 02/02/24 2057    fluticasone-umeclidin-vilanterol (TRELEGY ELLIPTA) 100-62.5-25 MCG/ACT inhaler 1 puff  1 puff Inhalation Daily Resp Alla Solomon CNP   1 puff at 02/02/24 0841    aspirin chewable 81 mg  81 mg Oral Daily Alla Solomon CNP   81 mg at 02/02/24 0841    cholecalciferol (VITAMIN D) tablet 25 mcg  25 mcg Oral Daily Alla Solomon CNP   25 mcg at 02/02/24 0841    pantoprazole (PROTONIX) EC tablet 40 mg  40 mg Oral QAM AC Alla Solomon CNP   40 mg at 02/02/24 0612    sildenafil (REVATIO) tablet 20 mg  20 mg Oral TID Alla Solomon CNP   20 mg at 02/02/24 2056    lidocaine (LIDOCARE) 4 % patch 2 patch  2 patch Transdermal Daily Alla Solomon CNP   2 patch at 02/02/24 0841         RESULTS     CBC:  Recent Labs   Lab 02/02/24 0314 02/01/24  0406 01/31/24  0349   WBC 9.4 9.7 9.3   HGB 11.8* 12.1* 13.0   HCT 34.9* 34.8* 38.6*   * 139* 158       BMP:  Recent Labs   Lab 02/02/24  1805 02/02/24 0314 02/01/24  2129   CO2 28 24 26   BUN 29* 25* 26*   CREATININE 1.50* 1.36* 1.38*   GLUCOSE 102* 102* 131*   CALCIUM 9.5 9.7 9.4       LFTs:  Recent Labs   Lab 02/02/24 0314 02/01/24  0406  01/31/24  0349   ALBUMIN 3.0* 3.0* 3.2*   AST 59* 44* 29       Coagulation:  No results found    Invalid input(s): \"PROTIME\"    Lab Results   Component Value Date    AST 59 (H) 02/02/2024    AST 33 09/08/2020       No results found for: \"AMYLASE\"    No results found for: \"TROPONINI\", \"CPK\", \"LACTATE\", \"LACTICACID\", \"FREET4\"    DIAGNOSTIC IMAGINING    My independent review:    XR CHEST AP OR PA    Result Date: 2/1/2024  Narrative: Exam: XR CHEST AP OR PA. Indication: PA catheter Comparison: Yesterday. Findings: Single view of the chest demonstrates stable cardiomediastinal silhouette. Right internal jugular Patterson-Deirdre catheter overlies the pulmonary outflow tract. Similar diffuse bilateral lower lobe predominant reticular and patchy airspace opacities. No large pleural effusion. No definite pneumothorax. The thoracic musculoskeletal structures and the upper abdomen are stable.     Impression: Impression: Stable exam as above. Support devices as above. Electronically Signed by: THOM RIVAS MD Signed on: 2/1/2024 11:38 AM Workstation ID: [id number]    XR CHEST AP OR PA    Result Date: 1/31/2024  Narrative: Exam: XR CHEST AP OR PA. Indication: I27.20 Pulmonary hypertension, unspecified (CMD)  5' 8\" ft 178.35 lb infiltrates R55 Syncope and [id number] Pulmonary fibrosis, unspecified (CMD) Comparison: Yesterday. Findings: Single view of the chest demonstrates stable cardiomediastinal silhouette. Right internal jugular Patterson-Deirdre catheter overlies the pulmonary outflow tract. Lungs are slightly better inflated with persistent diffuse bilateral lower lobe predominant reticular and patchy airspace opacities. No large pleural effusion. No definite pneumothorax. The thoracic musculoskeletal structures and the upper abdomen are stable.     Impression: Impression: Slightly improved lung inflation with persistent bilateral lower lobe predominant reticular and patchy airspace opacities, may correspond to existing pulmonary  fibrosis although superimposed infection or inflammation is not excluded. Support devices as above. Electronically Signed by: THOM RIVAS MD Signed on: 1/31/2024 11:27 AM Workstation ID: [id number]    XR CHEST AP OR PA    Result Date: 1/30/2024  Narrative: AP PORTABLE CHEST 2018 CLINICAL INDICATION:swan ADJUSTMENT COMPARISON: January 30, 2024 TECHNIQUE: AP portable chest.  FINDINGS: The right internal jugular Redding-Deirdre catheter has been manipulated and now appears to be in the main pulmonary outflow tract. There are bilateral lower lobe infiltrates noted. Heart size within normal limits. Bones grossly negative.     Impression: Redding-Deirdre catheter in the main pulm outflow tract. Bilateral lower lobe infiltrates stable Electronically Signed by: CARMELO DELA CRUZ M.D Signed on: 1/30/2024 8:53 PM Workstation ID: [id number]    XR CHEST AP OR PA    Result Date: 1/30/2024  Narrative: HISTORY:s/p swan insertion. 5' 8\" ft 170.64 lb Single AP    portable view of the chest comparison: January 28, 2024      Impression: Impression: Cardiac silhouette and mediastinum are normal. Bilateral basilar infiltrates are relatively stable. Lungs are grossly clear otherwise. No pneumothorax. Right IJ catheter is new and is coiled within the right atrium. Remainder of the exam is otherwise stable. Electronically Signed by: DONAL SAUER MD Signed on: 1/30/2024 8:07 PM Workstation ID: [id number]    MRI LUMBAR SPINE WO CONTRAST    Result Date: 1/29/2024  Narrative: EXAMINATION: Magnetic resonance imaging (MRI) of the lumbar spine without contrast HISTORY: Compression fractures. TECHNIQUE: MRI of the lumbar spine was performed without contrast according to standard protocol. COMPARISON: Radiograph from 1/28/2024. PET/CT from 1/18/2024. CT chest from 6/16/2023. FINDINGS: There have been prior laminectomies at T12 and L1. There is an unchanged chronic compression fracture of the T12 vertebral body with greater than 90% anterior  height loss, 5 mm retropulsion, and associated focal kyphosis. There is an acute or subacute horizontal compression fracture in the L1 vertebral body with associated marrow edema. There is a superimposed on a chronic deformity of the L1 inferior endplate with a prominent Schmorl's node. There is at least mild height loss of the L1 vertebral body without significant retropulsion. The posterior elements appear intact. There is questionable small subdural hemorrhage along the right lateral wall of the thecal sac at L1-L2 (series 8 image 14). There are mild chronic compression deformities of the L2, L3, L4, and L5 vertebral bodies along the endplates with mild central height loss and no significant retropulsion. There is mild multilevel lumbar degenerative disc disease and facet osteoarthritis. There is mild spinal canal stenosis at T12 related to retropulsion. There is mild spinal canal stenosis at L1-L2. There is moderate to severe spinal canal stenosis at L2-L3 related to spondylosis. There is no spinal canal stenosis in the lower lumbar spine. No significant neural foraminal stenosis is seen. The distal spinal cord appears normal in signal intensity with the conus medullaris ending at the L1-L2 level. No epidural hematoma or fluid collection is seen. There is mild atrophy of the posterior lumbar paraspinal muscles. Aortic atherosclerosis is noted. No other significant soft tissue abnormality is identified.     Impression: 1. Acute or subacute horizontal compression fracture of the L1 vertebral body superimposed on chronic L1 deformity with mild height loss and no significant retropulsion. No posterior element involvement. Questionable small subdural hemorrhage along the right lateral thecal sac wall at L1-L2 without significant mass effect. 2. Unchanged severe chronic compression fracture of the T12 vertebral body with marked anterior height loss, 5 mm retropulsion, mild spinal canal stenosis, and associated focal  kyphosis. 3. Unchanged mild chronic compression deformities of the L2, L3, L4, and L5 vertebral bodies along the endplates without retropulsion. 4. Moderate to severe spinal canal stenosis at L2-L3 related to spondylosis. Mild spinal canal stenosis at L1-L2. Prior laminectomies at T12 and L1. Electronically Signed by: RICK WARD MD Signed on: 2024 1:50 PM Workstation ID: [id number]    TRANSTHORACIC ECHO (TTE) LIMITED W/ W/O IMAGING AGENT    Impression: *[facility]* [address] [address] [phone] Limited Transthoracic Echocardiogram (TTE) Patient: Km Dunbar     Study Date/Time:       2024 1:50PM MRN:     [id number]              FIN#:                  [id number] :     1960           Ht/Wt:                 172cm 75.8kg Age:     63                   BSA/BMI:               1.91m^2 25.6kg/m^2 Gender:  M                    Baseline BP:           121 / 81 Ordering Physician:   Ellen Wilson  Referring Physician:  Tai Zavaleta  Attending Physician:  Janie Wilson  Diagnostic Physician: Rachel Tapia MD Sonographer:          Lia Blanco  ------------------------------------------------------------------------------ INDICATIONS:   Pulmonary hypertension. ------------------------------------------------------------------------------ STUDY CONCLUSIONS SUMMARY: 1. Left ventricle: Systolic function is normal. The ejection fraction was    measured by single plane method of disks. The ejection fraction is 63%. 2. Left atrium: The atrium is mildly dilated. 3. Right ventricle: The cavity size is at the upper limits of normal. Systolic    function is normal. The estimated peak pressure is 50mm Hg. 4. Tricuspid valve: There is moderate-severe regurgitation. ------------------------------------------------------------------------------ STUDY DATA:  Patient room number: 8356-W1. Comparison is made to the study of 2024.   Procedure:  A transthoracic echocardiogram was performed. Image quality was good. The study was technically limited due to poor acoustic window availability and excessive abdominal air.  M-mode, complete 2D, complete spectral Doppler, and color Doppler.  Study status:  Routine.  Study completion:  There were no complications. FINDINGS LEFT VENTRICLE:  Systolic function is normal.    The ejection fraction was measured by single plane method of disks. The ejection fraction is 63%. AORTIC VALVE:  The annulus is normal. The valve is trileaflet. The leaflets are normal thickness. MITRAL VALVE:  The annulus is normal. The leaflets are normal thickness. LEFT ATRIUM:  The atrium is mildly dilated. RIGHT VENTRICLE:  The cavity size is at the upper limits of normal. Systolic function is normal. The TAPSE is normal, suggestive of normal RV systolic function. The estimated peak pressure is 50mm Hg.       The RV pressure during systole is 50mm Hg. TRICUSPID VALVE:  The valve is structurally normal. There is moderate-severe regurgitation. RIGHT ATRIUM:  The atrium is dilated.       The estimated central venous pressure is 8mm Hg. PERICARDIUM:   There is no pericardial effusion. SYSTEMIC VEINS: Inferior vena cava: The IVC is dilated.  Respirophasic diameter changes are in the normal range (>= 50%). ------------------------------------------------------------------------------ Measurements  Left ventricle            Value        Ref       01/09/2024  Right ventricle continued     Value          Ref        01/09/2024  JALIL, LAX chord        (N) 4.6   cm     4.2 - 5.8 ----------  Pressure, S                   50    mm Hg    ---------- ----------  ESD, LAX chord        (N) 3.3   cm     2.5 - 4.0 ----------  S' lateral                (H) 17.2  cm/sec   6.0 - 13.4 ----------  JALIL/bsa, LAX chord    (N) 2.4   cm/m^2 2.2 - 3.0 ----------  ESD/bsa, LAX chord    (N) 1.7   cm/m^2 1.3 - 2.1 ----------  Left atrium                   Value           Ref        01/09/2024  PW, ED, LAX           (H) 1.2   cm     0.6 - 1.0 ----------  AP dim, ES                (N) 4.0   cm       3.0 - 4.0  ----------  JALIL major ax, A4C         8.8   cm     --------- ----------  AP dim index, ES          (N) 2.1   cm/m^2   1.5 - 2.3  ----------  ESD major ax, A4C         7.8   cm     --------- ----------  Area ES, A4C              (H) 21    cm^2     <=20       ----------  FS major axis, A4C        11    %      --------- ----------  Area/bsa ES, A4C              10.97 cm^2/m^2 ---------- ----------  JALIL/bsa major ax, A4C     4.6   cm/m^2 --------- ----------  Area ES, A2C                  18    cm^2     ---------- ----------  ESD/bsa major ax, A4C     4.1   cm/m^2 --------- ----------  Area/bsa ES, A2C              9.14  cm^2/m^2 ---------- ----------  REBECCA, A4C                  31.8  cm^2   --------- ----------  Vol, S                    (N) 47    ml       18 - 58    ----------  OWEN, A4C                  17.8  cm^2   --------- ----------  Vol/bsa, S                (N) 25    ml/m^2   16 - 34    ----------  FAC, A4C                  44    %      --------- ----------  Vol, ES, 1-p A4C          (N) 54    ml       18 - 58    ----------  IVS, ED               (H) 1.2   cm     0.6 - 1.0 ----------  Vol/bsa, ES, 1-p A4C      (N) 28    ml/m^2   12 - 37    ----------  PW, ED                (H) 1.2   cm     0.6 - 1.0 ----------  Vol, ES, 1-p A2C          (N) 41    ml       18 - 58    ----------  IVS/PW, ED                0.99         --------- ----------  Vol/bsa, ES, 1-p A2C      (N) 21    ml/m^2   11 - 43    ----------  EDV                   (N) 97    ml     62 - 150  ----------  Vol, ES, 2-p                  47    ml       ---------- ----------  ESV                   (N) 37    ml     21 - 61   ----------  Vol/bsa, ES, 2-p          (N) 25    ml/m^2   16 - 34    ----------  EF                    (N) 63    %      52 - 72   60  SV                        52    ml     --------- ----------   Tricuspid valve               Value          Ref        01/09/2024  EDV/bsa               (N) 51    ml/m^2 34 - 74   ----------  TR peak v                 (H) 3.2   m/sec    <=2.8      4.6  ESV/bsa               (N) 19    ml/m^2 11 - 31   ----------  Peak RV-RA grad, S            42    mm Hg    ---------- 85  SV/bsa                    27    ml/m^2 --------- ----------  SV, 1-p A4C               59    ml     --------- ----------  Aortic root                   Value          Ref        01/09/2024  SV/bsa, 1-p A4C           31    ml/m^2 --------- ----------  Root diam, ED             (N) 2.6   cm       2.6 - 4.1  ----------  ESV, 2-p              (N) 35    ml     21 - 61   ----------  ESV/bsa, 2-p          (N) 18    ml/m^2 11 - 31   ----------  Pulmonary artery              Value          Ref        01/09/2024                                                               Pressure, S                   50    mm Hg    ---------- 91  Right ventricle           Value        Ref       01/09/2024  JALIL, LAX                  3.1   cm     --------- ----------  Systemic veins                Value          Ref        01/09/2024  TAPSE, MM             (N) 2.0   cm     >=1.7     ----------  Estimated CVP                 8     mm Hg    ---------- 8 Legend: (L)  and  (H)  onofre values outside specified reference range. (N)  marks values inside specified reference range. Prepared and electronically signed by Rachel Tapia MD 01/29/2024 15:17    XR THORACIC SPINE 3 VIEWS    Result Date: 1/28/2024  Narrative: EXAM: XR THORACIC SPINE 3 VIEWS CLINICAL INDICATION: Pain in the thoracic spine. COMPARISON: Radiographs of the thoracic spine from 8/30/2021. TECHNIQUE: AP and lateral views of the thoracic spine and a swimmer's view to include upper thoracic spine are submitted for evaluation. FINDINGS: There is severe compression fracture of T12, similarly present on 8/30/2021. There are minimal compression fractures of superior endplates of T11,  T10, T9, mild compression fracture of T8, mild compression fracture of T5, mild compression fracture of T4, all present on prior exam. Pedicles are intact. There are minor diffuse degenerative changes. Bones are osteopenic. Mild diffuse degenerative changes are present.     Impression: 1. Severe compression fracture of T12, similarly present on 8/30/2021. Minimal compression fractures of T11, T10, and T9, similarly present on 8/30/2021. Mild compression fractures of T8, T5, and T4, similarly present on 8/30/2021. 2. Mild diffuse degenerative disease. 3. Osteopenia. 4. Further imaging evaluation can be obtained with MRI of the thoracic spine if clinically indicated. Electronically Signed by: ISABEL GUERRERO MD Signed on: 1/28/2024 9:56 AM Workstation ID: [id number]    XR LUMBAR SPINE 2 OR 3 VIEWS    Result Date: 1/28/2024  Narrative: EXAM: XR LUMBAR SPINE 2 OR 3 VIEWS CLINICAL INDICATION: Back pain. COMPARISON: Radiographs of the lumbar spine from 10/6/2020. TECHNIQUE: AP, lateral views of the lumbar spine and a coned down view of the lumbosacral junction are obtained.  FINDINGS: There is severe wedge-shaped compression fracture of T12, also present on 10/6/2020. There is minimal compression fracture of L1, which was not present on 10/6/2020. There is mild wedge-shaped compression fracture of L2, also present on 10/6/2020. There are minimal compression fractures of the superior endplates of L3, L4, and L5, also present on 10/6/2020. Pedicles are intact. There is no spondylolisthesis or convincing evidence of spondylolysis. There are severe degenerative changes at L5-S1 with disc space narrowing and osteophyte formation, similar to prior exam. There are milder degenerative changes at the remainder of the lumbar levels. Bones are mildly osteopenic. There is severe atherosclerotic disease in the aorta and branches. Patient has a right hip arthroplasty on edge of exam.     Impression: 1. Minimal compression fracture of  L1, which has developed in the interval since 10/6/2020. Severe compression fracture of T12, similarly present on 10/6/2020. Moderate compression fracture of L2, similarly present on 10/6/2020. Minimal fractures of superior endplates of L3, L4, and L5, similarly present on 10/6/2020. 2. Severe degenerative disease at L5-S1 with disc space narrowing. Milder degenerative changes at the remainder of the lumbar levels. 3. Mild osteopenia. 4. Further evaluation of the lumbar spine can be obtained with MRI, if clinically indicated. 5. Right hip arthroplasty on edge of exam. 6. Severe atherosclerotic disease in the aorta and branches. Electronically Signed by: ISABEL GUERRERO MD Signed on: 1/28/2024 9:51 AM Workstation ID: [id number]    CT CERVICAL SPINE WO CONTRAST    Result Date: 1/28/2024  Narrative: EXAM: CT CERVICAL SPINE WO CONTRAST CLINICAL INDICATION: Trauma. Cervical spine pain. COMPARISON: CT scan of the cervical spine from 1/8/2024. TECHNIQUE: CT scan of the cervical spine is obtained. Adjustment of the mA and or kV was done based upon the patient's size.  FINDINGS: There is no CT evidence of acute cervical spine fracture, dislocation, or subluxation. There is no bone destructive lesion. There are mild degenerative changes at C4-5 and C5-6. There are minor degenerative changes at the remainder of the cervical levels. This CT is not adequate to evaluate for disc extrusions or protrusions. If there is clinical concern regarding possible disc extrusions or protrusions, then further evaluation is suggested with MR.     Impression: 1. No CT evidence of acute cervical spine fracture or dislocation. 2. If the patient's neck pain persists, then further evaluation is suggested with MRI. Electronically Signed by: ISABEL GUERRERO MD Signed on: 1/28/2024 8:47 AM Workstation ID: [id number]    CT HEAD WO CONTRAST    Result Date: 1/28/2024  Narrative: EXAM: CT HEAD WO CONTRAST CLINICAL INDICATION: Trauma. Patient fell.  Headache. COMPARISON: Noncontrast head CT exam from 1/8/2024. TECHNIQUE: CT exam of the brain is obtained without intravenous contrast. Adjustment of the MA and or kV was done based on the patient's size.  FINDINGS: There is no intracranial hemorrhage. No extra-axial fluid collection is noted. Ventricular system is normal in size and configuration. There is no midline shift. No mass effect or evidence of acute cortical infarction is noted on this noncontrast study. There are low-density changes in the white matter of both cerebral hemispheres, consistent with chronic ischemic small vessel disease. There is mild diffuse generalized parenchymal atrophy. Posterior fossa and inferior portions of temporal lobes are not well evaluated by CT due to streak artifacts. Visualized portions of orbits are unremarkable. There is mild mucosal thickening in the inferior portion of the right maxillary sinus. There is mild mucosal thickening in the right sphenoid sinus. There is mild mucosal thickening in a few right posterior ethmoid air cells. No calvarial fracture is noted.     Impression: 1. No intracranial hemorrhage. No mass effect. No evidence of acute cortical infarction on this noncontrast head CT exam. 2. Further evaluation of the brain can be obtained with MRI, if clinically indicated. Electronically Signed by: ISABEL GUERRERO MD Signed on: 1/28/2024 8:43 AM Workstation ID: [id number]    XR CHEST AP OR PA    Result Date: 1/28/2024  Narrative: EXAM: XR CHEST AP OR PA CLINICAL INDICATION: SOB COMPARISON: Comparison is made with the chest radiographs from 1/12/2024 and 12/26/2023. Comparison is made with the chest CT exam from 1/8/2024. TECHNIQUE: Portable chest radiograph is obtained on 1/28/2024 8:00 AM. FINDINGS: Heart is mildly enlarged in size with exaggeration of the portable technique, unchanged. Pulmonary vasculature is normal. There are mild patchy infiltrates in both lower lungs. There is underlying interstitial  lung disease. There are mild emphysematous changes in the lungs. Patient has had previous partial resection of right lung. There is no significant pleural fluid collection. No pneumothorax is noted on this portable film. Mediastinum, soft tissues and bones are unchanged on the portable film.      Impression: 1. Mild patchy infiltrates in both lower lungs. 2. Previous partial resection of right lung. 3. Nonspecific interstitial lung disease, similar to prior exams. 4. COPD. 5. Mild cardiomegaly. Electronically Signed by: ISABEL GUERRERO MD Signed on: 1/28/2024 8:30 AM Workstation ID: [id number]    PET CT FDG SKULL BASE TO MID-THIGH SUBSEQUENT    Result Date: 1/19/2024  Narrative: EXAMINATION: FDG PET/CT on 1/18/2024 CLINICAL INDICATION: Lung cancer follow-up COMPARISON: PET/CT 3/10/2022, CT pulmonary angiogram 1/8/2024 PROCEDURE:  Following at least four hours of fasting, the patient's blood glucose was 100 mg/dl.  One hour following the intravenous injection of 10.2 mCi F-18 FDG, CT scan was obtained from the top of the head through the upper thighs  with a 3.75 mm helical acquisition.    Dose reduction was accomplished through the adjustment of mA according to patient size.  Then, PET  images were obtained through the same region.  Attenuation corrected images were constructed using the CT scan.  Fused images from the PET and CT images were reviewed on the [employer] workstation.  Standard uptake values (SUV) reported reflect maximum values within a region of interest. FINDINGS: Evaluation of the head demonstrates normal symmetric brain metabolism. There are no enlarged or hypermetabolic lymph nodes in the neck.  There is no abnormal uptake within the thyroid, salivary glands, or remaining head and cervical structures. There is heavy calcification of the carotid bulbs.  The lungs exhibit prominent paraseptal emphysematous changes. There are also bibasilar interstitial fibrotic changes. There is no local  recurrence of the patient's right upper lobe lung cancer.  There are no pulmonary nodules.  There are no enlarged or hypermetabolic lymph nodes in the hilum or mediastinum. The small mediastinal lymph nodes do not exhibit increased radionuclide accumulation. There are no pleural effusions. There is a small hiatal hernia. The heart is mildly enlarged, and there is heavy coronary artery calcification. Evaluation of the abdomen and pelvis demonstrates normal uptake within the liver, spleen, pancreas, adrenal glands, kidneys, and collecting systems. There are no enlarged or hypermetabolic lymph nodes in the abdomen or pelvis. The aorta and iliac arteries are heavily calcified. Sigmoid diverticulosis is present without diverticulitis.  No metabolically active areas are noted within the osseous structures. There is a severe wedge compression deformity at T12, unchanged. Severe disc space degeneration is noted at L5-S1. There is a right total hip arthroplasty without increased radionuclide accumulation. Evaluation of the muscular and soft tissue structures reveals no abnormal metabolic activity.     Impression: *   No evidence of local recurrence of the patient's lung cancer or metastatic disease *   Heavy atherosclerotic calcification is present at the carotid bulbs, consider carotid Doppler ultrasound or better CT angiogram of the neck. *   The lung findings are consistent with the clinical diagnosis of COPD. There may be bibasilar chronic interstitial lung disease with emphysema in the upper lobes. *   There is cardiomegaly with heavy coronary artery calcification, consider cardiac evaluation *   Small hiatal hernia *   Sigmoid diverticulosis *   Right total hip arthroplasty *   Stable chronic wedge compression at T12 Electronically Signed by: XAVI JOHN MD Signed on: 1/19/2024 1:01 PM Workstation ID: [id number]    XR CHEST AP OR PA    Result Date: 1/12/2024  Narrative: Exam: XR CHEST AP OR PA. Indication: 5'  8\" ft 172.4 lb PA catheter placement evaluation Comparison: 1/8/2024. Findings: Single view of the chest demonstrates stable cardiomediastinal silhouette. Right internal jugular [place]irdre catheter overlies the left main pulmonary artery. Similar bibasilar interstitial and hazy parenchymal opacities. Suspect small pleural effusions. No definite pneumothorax. The thoracic musculoskeletal structures and the upper abdomen are stable.     Impression: Impression: Right internal jugular [place]-Deirdre catheter terminates over the left main pulmonary artery. Otherwise stable exam. Electronically Signed by: THOM RIVAS MD Signed on: 1/12/2024 10:46 AM Workstation ID: [id number]    Cath/PV Case    Result Date: 1/11/2024  Narrative: RIGHT HEART CATHETERIZATION No sedation given FINDINGS: 1. Right atrial pressures:   12  / 10 / 10 mmHg 2. Right ventricular pressures:   73 / 8 / 10  mmHg 3. Pulmonary artery pressures:   78 / 34 / 49  mmHg 4. Pulmonary capillary wedge pressures:   12 / 11 / 11  mmHg 5. Transpulmonary Gradient:     38 6. Pulmonary artery saturations:    60 % 7. Aortic saturations:      94 % 8. Cardiac output and index calculated by Radha method:  3.6 L/min, 1.8 L/min/m2 9. Cardiac output and index by thermodilution method:  3.4 L/min, 1.7 L/min/m2 10. Supplemental cardio-pulmonary support:  O2: 4L NC  Inotrope:  None 11. Heart rate:  68 bpm 12. Systemic blood pressure:  139 / 92 / 108  mmHg 13. Systemic vascular resistance: 27.2 Woods Units, 2173 dynes * sec/cm5 14. Pulmonary vascular resistance: 10.5 Woods Units, 843 dynes * sec/cm5 15. Right ventricular function parameters:   Right ventricular stroke work index: 1032 mmHg * ml / m2  Pulmonary artery pulsitility index:  4.4  RA/PCWP ratio:  0.91 CONCLUSIONS:  Reduced cardiac output  Mildly elevated RA, RVEDP pressures  Severely elevated RV systolic pressure  Normal PAWP  Severely elevated pulmonary arterial pressure .Reduced right ventricular function    Elevated SVR  Severely elevated PVR . RECOMMENDATIONS: [place] sutured right neck at 52 cm Transfer to CVTU for hemodynamic monitoring Pulm recs regarding pulmonary fibrosis Follow up autoimmune seriologies Further recs per rounding PH team Michael Drummond DO, FACC, FHFSA, FACOI Advanced Heart Failure, Mechanical Circulatory Support, Transplant Cardiology & Pulmonary Hypertension [facility] Phone: [phone] (internal)  [phone] (external)     CTA CHEST PULMONARY EMBOLISM    Result Date: 1/9/2024  Narrative: PROCEDURE:  CTA CHEST PULMONARY EMBOLISM CLINICAL INDICATION: Recurrent syncope. Hypoxia. TECHNIQUE: Contrast enhanced helical CT pulmonary angiography images were obtained.  Routine axial and coronal reformations as well as post-processed MIP reformations were obtained. The mA and/or kV was adjusted for patient size. CONTRAST: 73 mL Omnipaque 350 COMPARISON: 9/1/2023 FINDINGS: There is adequate opacification of the pulmonary arterial system. No acute pulmonary embolism is identified to the segmental pulmonary artery level. The main pulmonary artery is at the upper limits of normal in caliber. No evidence of right heart strain. The heart is enlarged. There is a trace pericardial effusion. The great vessels are otherwise normal in caliber. Mild to moderate scattered vascular calcifications are seen. No dissection. The central airways are patent. There is mild bilateral bronchial wall thickening. No pneumothorax. Moderate to severe centrilobular and paraseptal emphysematous changes are noted. There is a trace right pleural effusion. Peripheral reticular opacities are noted in the lung bases with scattered regions of probable honeycombing, most pronounced in the posterior left lower lobe and medial as well as lateral aspects of the right lower lobe. There are findings of mild traction bronchiectasis as well. There is mosaic attenuation of the lung parenchyma. Biapical pleural-parenchymal  scarring is noted. There are several prominent mediastinal and bilateral hilar lymph nodes. Soft tissue nodules versus prominent nodes in the right apex/posterior subclavicular region appear stable from the prior. Dominant nodule measures 1.5 cm in diameter (3/34), unchanged from the prior. Visualized portions of the thyroid gland are unremarkable. The esophagus is decompressed. There is a small hiatal hernia. There are mild degenerative changes in the spine. Scattered superior endplate irregularity is noted, similar to the prior. There is mild generalized osseous demineralization. Visualized portions of the upper abdomen are grossly unremarkable and appear similar to the prior.     Impression: 1.   No acute pulmonary embolism. Borderline dilated main pulmonary artery. 2.   Cardiomegaly. Moderate atherosclerotic vascular disease. 3.   Stable findings of pulmonary fibrosis and moderate to severe emphysema. 4.   Relatively stable mediastinal, hilar, and right subclavicular/right apical adenopathy. 5.   Additional findings as above. Electronically Signed by: MORENA HERNANDEZ MD Signed on: 2024 10:20 AM Workstation ID: [id number]    TRANSTHORACIC ECHO (TTE) LIMITED W/ W/O IMAGING AGENT    Impression: *[facility]* [address] [place] [address] [phone] Limited Transthoracic Echocardiogram (TTE) Patient: Km Dunbar     Study Date/Time:       2024 9:09AM MRN:     [id number]              FIN#:                  [id number] :     1960           Ht/Wt:                 172.7cm 82.6kg Age:     63                   BSA/BMI:               2.01m^2 27.7kg/m^2 Gender:  M                    Baseline BP:           96 / 62 Ordering Physician:     Ingrid Barragan MD  Referring Physician:    Ingrid Barragan MD  Attending Physician:    Cody Parikh Performing Physician:   [facility], Cardiology Diagnostic Physician:   Hernan Perkins MD Sonographer:            Funmilayo Brooks   ------------------------------------------------------------------------------ INDICATIONS:   RVSP.  Syncope. ------------------------------------------------------------------------------ STUDY CONCLUSIONS SUMMARY: 1. Left ventricle: Systolic function is normal. The ejection fraction was    measured by visual estimation. The ejection fraction is 60%. 2. Ventricular septum: There is diastolic flattening and systolic flattening. 3. Right ventricle: The cavity size is dilated. Wall thickness is normal.    Systolic function is mildly to moderately reduced. Systolic pressure is    severely increased. The estimated peak pressure is 110mm Hg. 4. Right atrium: The atrium is mildly to moderately dilated. 5. Tricuspid valve: There is moderate-severe regurgitation. ------------------------------------------------------------------------------ STUDY DATA:  Comparison is made to the study of 12/27/2023.  Procedure:  A transthoracic echocardiogram was performed. Image quality was adequate. Limited 2D, limited spectral Doppler, and color Doppler.  Study status: Routine.  Study completion:  There were no complications. FINDINGS LEFT VENTRICLE:  Systolic function is normal. Wall motion is normal; there are no regional wall motion abnormalities.    The ejection fraction was measured by visual estimation. The ejection fraction is 60%. AORTIC VALVE:  The valve is structurally normal. AORTA: Aortic root: The root is normal-sized. MITRAL VALVE:  The valve is structurally normal. ATRIAL SEPTUM:  The septum is normal. RIGHT VENTRICLE:  The cavity size is dilated. Wall thickness is normal. Systolic function is mildly to moderately reduced.  Systolic pressure is severely increased. The estimated peak pressure is 110mm Hg. VENTRICULAR SEPTUM:   There is diastolic flattening and systolic flattening. PULMONIC VALVE:   The valve is structurally normal. Cusp separation is normal. Velocity is within the normal range. The peak systolic gradient is  2mm Hg. TRICUSPID VALVE:  The valve is structurally normal. Inflow velocity is within the normal range. There is moderate-severe regurgitation. RIGHT ATRIUM:  The atrium is mildly to moderately dilated.       The estimated central venous pressure is 8mm Hg. PERICARDIUM:  The pericardium is normal in appearance.  There is no pericardial effusion. SYSTEMIC VEINS: Inferior vena cava: The IVC is dilated.  Respirophasic diameter changes are in the normal range (>= 50%). ------------------------------------------------------------------------------ Measurements  Left ventricle     Value       Ref     12/27/2023  Tricuspid valve        Value       Ref   12/27/2023  EF             (N) 60    %     52 - 72 65          TR peak v          (H) 4.6   m/sec <=2.8 4.5                                                     Peak RV-RA grad, S     85    mm Hg ----- 80  Pulmonic valve     Value       Ref     12/27/2023  Peak v, S          0.7   m/sec ------- 0.9         Pulmonary artery       Value       Ref   12/27/2023  Peak grad, S       2     mm Hg ------- 3           Pressure, S            91    mm Hg ----- ----------  NC v, ED           1.18  m/sec ------- 1.63        Pressure ED            14    mm Hg ----- ----------  NC grad, ED        6     mm Hg ------- 11                                                     Systemic veins         Value       Ref   12/27/2023                                                     Estimated CVP          8     mm Hg ----- 3 Legend: (L)  and  (H)  onofre values outside specified reference range. (N)  marks values inside specified reference range. Prepared and electronically signed by Hernan Perkins MD 01/09/2024 11:35    XR CHEST AP OR PA    Result Date: 1/8/2024  Narrative: EXAMINATION: Chest single view study on   1/8/2024 12:51 PM CLINICAL INDICATION: 63-year-old man presenting to the emergency room after syncopal episode COMPARISON: 9/1/2023 12/26/2023 FINDINGS: A single AP upright   portable view  of the chest was obtained. The heart is normal in size and contour. An electronic device overlies the mid chest. Chronic interstitial lung disease is noted in the lower lung fields and emphysema is noted in the upper lung fields, unchanged. The costophrenic angles are sharp. Bones, joints, and soft tissues are unremarkable.     Impression: There is a stable appearance of the chest with chronic interstitial lung disease and emphysema, unchanged compared to the previous studies listed above. Underlying acute basilar lung infiltrates cannot be excluded. Electronically Signed by: XAVI JOHN MD Signed on: 1/8/2024 1:07 PM Workstation ID: [id number]    CT HEAD WO CONTRAST    Result Date: 1/8/2024  Narrative: EXAMINATION: CT scan of the brain without contrast CLINICAL INDICATION: 63-year-old male with a history of head injury with syncope COMPARISON: 12/26/2023, 9/2/2023 TECHNIQUE: 2.5 mm axial images through the brain were obtained without  intravenous contrast.  The study was performed on a 128 channel multidetector CT scanner. Thin section axial, sagittal, and coronal reconstructions were created from the volumetric data set.  Dose reduction was accomplished through the adjustment of mA according to patient size. FINDINGS: There is no intracranial hemorrhage or extra-axial fluid collection. There is no mass, midline shift, or edema in the brain. The ventricular system is mildly dilated. Cortical sulci and gyri are prominent. Decreased attenuation is noted in the periventricular white matter. There is no evidence of acute cortical ischemia. There is no evidence of brainstem compression. Mastoid air cells, middle ear cavities, and paranasal sinuses are clear. The cavernous carotid arteries are calcified. Bone window images do not reveal any depressed or basilar skull fractures.     Impression: Stable appearance of the brain compared to the previous study 4 months earlier with atrophy and white matter  demyelination, unchanged. No acute intracranial pathology identified.. Electronically Signed by: XAVI JOHN MD Signed on: 1/8/2024 1:04 PM Workstation ID: [id number]    CT CERVICAL SPINE WO CONTRAST    Result Date: 1/8/2024  Narrative: PROCEDURE:  CT CERVICAL SPINE WO CONTRAST. COMPARISON: None. INDICATIONS: fall, head injury, neck pain TECHNIQUES: Multiple axial images of CT CERVICAL SPINE WO CONTRAST were obtained using 1 mm collimation.  Sagittal and coronal reconstructed images were also acquired. Adjustment of the mA and/or kV was done based on the patient's size.  FINDINGS: There is diffuse osteopenia. Straightening of the cervical spine and mild lower cervical levocurvature are nonspecific. Vertebral body height is normal. No acute fracture or subluxation. Moderate spondylosis is present at the C3-C6 levels with disc base narrowing, marginal osteophyte formation and facet apathy. The craniocervical junction is unremarkable. Prevertebral soft tissues are within normal limits..     Impression: No acute fracture or subluxation. Mild to moderate spondylosis. Electronically Signed by: ROLDAN PERDOMO MD Signed on: 1/8/2024 1:03 PM Workstation ID: [id number]

## 2024-04-19 NOTE — TRANSFER OF CARE
"Anesthesia Transfer of Care Note    Patient: Gunnar Galindo    Procedure(s) Performed: Procedure(s) (LRB):  COLONOSCOPY (N/A)    Patient location: GI    Anesthesia Type: MAC    Transport from OR: Transported from OR on room air with adequate spontaneous ventilation    Post pain: adequate analgesia    Post assessment: no apparent anesthetic complications and tolerated procedure well    Post vital signs: stable    Level of consciousness: awake    Nausea/Vomiting: no nausea/vomiting    Complications: none    Transfer of care protocol was followed      Last vitals: Visit Vitals  /83 (BP Location: Left arm, Patient Position: Lying)   Pulse 67   Temp 37.1 °C (98.8 °F)   Resp 18   Ht 6' 2" (1.88 m)   Wt 104.3 kg (230 lb)   SpO2 97%   BMI 29.53 kg/m²     "

## 2024-04-19 NOTE — PROGRESS NOTES
Received communication regarding this patient having abnormal colonoscopy that is concerning for Crohn's disease. He does have chronic diarrhea. Of note, he uses NSAIDs which can make dx more difficult.     Recommend the following:  - serum and fecal biomarkers  - stool studies to rule out infection   - other labs needed to start treatment if this is, in fact, determined to be Crohn's disease.   - avoid ALL NSAIDs    Awaiting pathology results for more information.   He will need visit in a 2-3 weeks to discuss symptoms, scope results, and other diagnostic tests.

## 2024-04-19 NOTE — H&P
[facility] ([facility])  Gastroenterology  H&P    Patient Name: Gunnar Galindo  MRN: [id number]  Admission Date: 4/19/2024  Code Status: Full Code    Attending Provider: Daniel Kimbrough MD   Primary Care Physician: Charlotte Lynn MD  Principal Problem:Special screening for malignant neoplasms, colon    Subjective:     History of Present Illness/Reasons for procedure(s): Screening colonoscopy.     Past Medical History:   Diagnosis Date    Hypertension        No current facility-administered medications on file prior to encounter.     Current Outpatient Medications on File Prior to Encounter   Medication Sig Dispense Refill    atorvastatin (LIPITOR) 10 MG tablet Take 1 tablet (10 mg total) by mouth once daily. 30 tablet 11    metoprolol succinate (TOPROL-XL) 25 MG 24 hr tablet Take 1 tablet (25 mg total) by mouth once daily. 30 tablet 11    buPROPion (WELLBUTRIN SR) 150 MG TBSR 12 hr tablet TAKE 1 TABLET BY MOUTH EVERY DAY FOR 7 DAYS THEN TAKE 1 TABLET BY MOUTH TWICE DAILY 60 tablet 5    ibuprofen (ADVIL,MOTRIN) 800 MG tablet TK 1 T PO  Q 6 TO 8 H PRN P         Past Surgical History:   Procedure Laterality Date    APPENDECTOMY      EYE SURGERY      eye sx  05/2020    FRACTURE SURGERY      KNEE SURGERY  1999    wrist sx  2015       Review of patient's allergies indicates:  No Known Allergies  Family History       Problem Relation (Age of Onset)    Cancer Paternal Grandfather    Diabetes Paternal Grandmother          Tobacco Use    Smoking status: Every Day     Current packs/day: 1.00     Average packs/day: 1 pack/day for 27.0 years (27.0 ttl pk-yrs)     Types: Cigarettes    Smokeless tobacco: Former     Types: Snuff   Substance and Sexual Activity    Alcohol use: Not Currently     Comment: rarely    Drug use: Never    Sexual activity: Yes     Partners: Female     Birth control/protection: None     Review of Systems   Constitutional:  Negative for activity change, appetite change, fatigue, fever  and unexpected weight change.   HENT:  Negative for congestion, ear pain, hearing loss, mouth sores, trouble swallowing and voice change.    Eyes:  Negative for pain, redness and itching.   Respiratory:  Negative for apnea, cough, choking, chest tightness, shortness of breath and wheezing.    Cardiovascular:  Negative for chest pain, palpitations and leg swelling.   Gastrointestinal:  Negative for abdominal distention, abdominal pain, anal bleeding, blood in stool, constipation, diarrhea, nausea and vomiting.   Endocrine: Negative for cold intolerance, heat intolerance and polyphagia.   Genitourinary:  Negative for dysuria, hematuria and urgency.   Musculoskeletal:  Negative for arthralgias, joint swelling and neck pain.   Skin:  Negative for pallor and rash.   Allergic/Immunologic: Negative for environmental allergies and food allergies.   Neurological:  Negative for dizziness, facial asymmetry and light-headedness.   Hematological:  Negative for adenopathy. Does not bruise/bleed easily.   Psychiatric/Behavioral:  Negative for agitation, behavioral problems, confusion and decreased concentration.      Objective:     Vital Signs (Most Recent):  Temp: 98.8 °F (37.1 °C) (04/19/24 0928)  Pulse: 67 (04/19/24 0928)  Resp: 18 (04/19/24 0928)  BP: 136/83 (04/19/24 0928)  SpO2: 97 % (04/19/24 0928) Vital Signs (24h Range):  Temp:  [98.8 °F (37.1 °C)] 98.8 °F (37.1 °C)  Pulse:  [67] 67  Resp:  [18] 18  SpO2:  [97 %] 97 %  BP: (136)/(83) 136/83     Weight: 104.3 kg (230 lb) (04/19/24 0928)  Body mass index is 29.53 kg/m².    No intake or output data in the 24 hours ending 04/19/24 0947    Lines/Drains/Airways       Peripheral Intravenous Line  Duration                  Peripheral IV - Single Lumen 04/19/24 0942 20 G Left;Posterior Hand <1 day                    Physical Exam  Vitals and nursing note reviewed.   Constitutional:       Appearance: He is well-developed.   HENT:      Head: Normocephalic and atraumatic.   Eyes:       Conjunctiva/sclera: Conjunctivae normal.      Pupils: Pupils are equal, round, and reactive to light.   Neck:      Thyroid: No thyromegaly.      Trachea: No tracheal deviation.   Cardiovascular:      Rate and Rhythm: Normal rate and regular rhythm.   Pulmonary:      Effort: Pulmonary effort is normal.      Breath sounds: Normal breath sounds. No wheezing or rales.   Chest:      Chest wall: No tenderness.   Abdominal:      General: Bowel sounds are normal. There is no distension.      Palpations: Abdomen is soft. There is no mass.      Tenderness: There is no abdominal tenderness. There is no guarding.   Musculoskeletal:         General: Normal range of motion.      Cervical back: Normal range of motion and neck supple.   Lymphadenopathy:      Cervical: No cervical adenopathy.   Skin:     General: Skin is warm and dry.   Neurological:      Mental Status: He is alert and oriented to person, place, and time.      Cranial Nerves: No cranial nerve deficit.   Psychiatric:         Behavior: Behavior normal.             Latest Ref Rng & Units 2/4/2023     8:44 AM 4/5/2024     7:08 AM   Lab Summary   Sodium 136 - 145 mmol/L 143  140    Potassium 3.5 - 5.1 mmol/L 5.1  5.0    Calcium 8.7 - 10.5 mg/dL 9.7  10.2    Creatinine 0.5 - 1.4 mg/dL 0.8  0.8    AST 10 - 40 U/L 16  24    Alk Phos 55 - 135 U/L 72  82    Bilirubin 0.1 - 1.0 mg/dL 0.5  0.3    Glucose 70 - 110 mg/dL 90  93    Cholesterol 120 - 199 mg/dL 203  201    HDL cholesterol 40 - 75 mg/dL 34  32    Triglycerides 30 - 150 mg/dL 296  222    Total protein 6.0 - 8.4 g/dL 7.1  6.9    Albumin 3.5 - 5.2 g/dL 4.3  4.0         Assessment/Plan:     Active Diagnoses:    Diagnosis Date Noted POA    PRINCIPAL PROBLEM:  Special screening for malignant neoplasms, colon [Z12.11] 04/18/2024 Not Applicable      Problems Resolved During this Admission:       Risks, benefits and alternative options were discussed with the patient. Risks including but not limited to infection, bleeding,  heart or respiratory problems and perforation that may require surgery were all explained to the patient. The patient had a chance for questions if there were doubts or concerns about the test. The referring provider will be notified that our consultation is complete and available through the patient's records.    Thanks for letting us participate in the care of this nice patient,      Daniel Kimbrough MD  Gastroenterology  [facility] ([facility])

## 2024-04-22 VITALS
DIASTOLIC BLOOD PRESSURE: 88 MMHG | WEIGHT: 230 LBS | RESPIRATION RATE: 17 BRPM | HEIGHT: 74 IN | TEMPERATURE: 98 F | SYSTOLIC BLOOD PRESSURE: 142 MMHG | HEART RATE: 68 BPM | OXYGEN SATURATION: 97 % | BODY MASS INDEX: 29.52 KG/M2

## 2024-04-22 LAB
FINAL PATHOLOGIC DIAGNOSIS: NORMAL
GROSS: NORMAL
Lab: NORMAL

## 2024-04-23 NOTE — PROGRESS NOTES
Gunnar Minh Galindo,    Biopsies obtained during your procedure are essentially benign/normal. I have a feeling that the findings we saw during your procedure, alongside the symptoms of diarrhea, are directly related to the use of NSAID''s (Advil). Nonetheless, I recommend to continue with the plans we discussed.     Thanks for using [facility], Aldo J. Russo, M.D.

## 2024-04-25 ENCOUNTER — PATIENT MESSAGE (OUTPATIENT)
Dept: GASTROENTEROLOGY | Facility: CLINIC | Age: 44
End: 2024-04-25
Payer: COMMERCIAL

## 2024-04-29 ENCOUNTER — PATIENT MESSAGE (OUTPATIENT)
Dept: GASTROENTEROLOGY | Facility: CLINIC | Age: 44
End: 2024-04-29
Payer: COMMERCIAL

## 2024-04-29 ENCOUNTER — LAB VISIT (OUTPATIENT)
Dept: LAB | Facility: HOSPITAL | Age: 44
End: 2024-04-29
Attending: NURSE PRACTITIONER
Payer: COMMERCIAL

## 2024-04-29 DIAGNOSIS — K52.9 CHRONIC DIARRHEA: ICD-10-CM

## 2024-04-29 DIAGNOSIS — K52.9 ILEITIS: ICD-10-CM

## 2024-04-29 DIAGNOSIS — R93.3 ABNORMAL COLONOSCOPY: ICD-10-CM

## 2024-04-29 LAB
25(OH)D3+25(OH)D2 SERPL-MCNC: 38 NG/ML (ref 30–96)
ALBUMIN SERPL BCP-MCNC: 4 G/DL (ref 3.5–5.2)
ALP SERPL-CCNC: 79 U/L (ref 55–135)
ALT SERPL W/O P-5'-P-CCNC: 40 U/L (ref 10–44)
ANION GAP SERPL CALC-SCNC: 12 MMOL/L (ref 8–16)
AST SERPL-CCNC: 21 U/L (ref 10–40)
BASOPHILS # BLD AUTO: 0.04 K/UL (ref 0–0.2)
BASOPHILS NFR BLD: 0.5 % (ref 0–1.9)
BILIRUB SERPL-MCNC: 0.4 MG/DL (ref 0.1–1)
BUN SERPL-MCNC: 11 MG/DL (ref 6–20)
CALCIUM SERPL-MCNC: 9.7 MG/DL (ref 8.7–10.5)
CHLORIDE SERPL-SCNC: 109 MMOL/L (ref 95–110)
CO2 SERPL-SCNC: 21 MMOL/L (ref 23–29)
CREAT SERPL-MCNC: 0.8 MG/DL (ref 0.5–1.4)
CRP SERPL-MCNC: 6.1 MG/L (ref 0–8.2)
DIFFERENTIAL METHOD BLD: NORMAL
EOSINOPHIL # BLD AUTO: 0.2 K/UL (ref 0–0.5)
EOSINOPHIL NFR BLD: 2.8 % (ref 0–8)
ERYTHROCYTE [DISTWIDTH] IN BLOOD BY AUTOMATED COUNT: 13.4 % (ref 11.5–14.5)
ERYTHROCYTE [SEDIMENTATION RATE] IN BLOOD BY WESTERGREN METHOD: 6 MM/HR (ref 0–10)
EST. GFR  (NO RACE VARIABLE): >60 ML/MIN/1.73 M^2
GLUCOSE SERPL-MCNC: 87 MG/DL (ref 70–110)
HAV IGG SER QL IA: NORMAL
HBV CORE AB SERPL QL IA: NORMAL
HBV SURFACE AB SER-ACNC: <3 MIU/ML
HBV SURFACE AB SER-ACNC: NORMAL M[IU]/ML
HBV SURFACE AG SERPL QL IA: NORMAL
HCT VFR BLD AUTO: 48.1 % (ref 40–54)
HCV AB SERPL QL IA: NORMAL
HGB BLD-MCNC: 15.5 G/DL (ref 14–18)
IMM GRANULOCYTES # BLD AUTO: 0.03 K/UL (ref 0–0.04)
IMM GRANULOCYTES NFR BLD AUTO: 0.4 % (ref 0–0.5)
LYMPHOCYTES # BLD AUTO: 2.8 K/UL (ref 1–4.8)
LYMPHOCYTES NFR BLD: 33.7 % (ref 18–48)
MCH RBC QN AUTO: 29.7 PG (ref 27–31)
MCHC RBC AUTO-ENTMCNC: 32.2 G/DL (ref 32–36)
MCV RBC AUTO: 92 FL (ref 82–98)
MONOCYTES # BLD AUTO: 0.6 K/UL (ref 0.3–1)
MONOCYTES NFR BLD: 7.6 % (ref 4–15)
NEUTROPHILS # BLD AUTO: 4.6 K/UL (ref 1.8–7.7)
NEUTROPHILS NFR BLD: 55 % (ref 38–73)
NRBC BLD-RTO: 0 /100 WBC
PLATELET # BLD AUTO: 233 K/UL (ref 150–450)
PMV BLD AUTO: 10.5 FL (ref 9.2–12.9)
POTASSIUM SERPL-SCNC: 4.3 MMOL/L (ref 3.5–5.1)
PROT SERPL-MCNC: 7.3 G/DL (ref 6–8.4)
RBC # BLD AUTO: 5.22 M/UL (ref 4.6–6.2)
SODIUM SERPL-SCNC: 142 MMOL/L (ref 136–145)
WBC # BLD AUTO: 8.27 K/UL (ref 3.9–12.7)

## 2024-04-29 PROCEDURE — 86704 HEP B CORE ANTIBODY TOTAL: CPT | Performed by: NURSE PRACTITIONER

## 2024-04-29 PROCEDURE — 86790 VIRUS ANTIBODY NOS: CPT | Performed by: NURSE PRACTITIONER

## 2024-04-29 PROCEDURE — 87340 HEPATITIS B SURFACE AG IA: CPT | Performed by: NURSE PRACTITIONER

## 2024-04-29 PROCEDURE — 86480 TB TEST CELL IMMUN MEASURE: CPT | Performed by: NURSE PRACTITIONER

## 2024-04-29 PROCEDURE — 86803 HEPATITIS C AB TEST: CPT | Performed by: NURSE PRACTITIONER

## 2024-04-29 PROCEDURE — 80053 COMPREHEN METABOLIC PANEL: CPT | Performed by: NURSE PRACTITIONER

## 2024-04-29 PROCEDURE — 84433 ASY THIOPURIN S-MTHYLTRNSFRS: CPT | Performed by: NURSE PRACTITIONER

## 2024-04-29 PROCEDURE — 86706 HEP B SURFACE ANTIBODY: CPT | Performed by: NURSE PRACTITIONER

## 2024-04-29 PROCEDURE — 85025 COMPLETE CBC W/AUTO DIFF WBC: CPT | Performed by: NURSE PRACTITIONER

## 2024-04-29 PROCEDURE — 82306 VITAMIN D 25 HYDROXY: CPT | Performed by: NURSE PRACTITIONER

## 2024-04-29 PROCEDURE — 86140 C-REACTIVE PROTEIN: CPT | Performed by: NURSE PRACTITIONER

## 2024-04-29 PROCEDURE — 36415 COLL VENOUS BLD VENIPUNCTURE: CPT | Performed by: NURSE PRACTITIONER

## 2024-04-29 PROCEDURE — 85651 RBC SED RATE NONAUTOMATED: CPT | Performed by: NURSE PRACTITIONER

## 2024-04-30 LAB
GAMMA INTERFERON BACKGROUND BLD IA-ACNC: 0.03 IU/ML
M TB IFN-G CD4+ BCKGRND COR BLD-ACNC: -0.01 IU/ML
M TB IFN-G CD4+ BCKGRND COR BLD-ACNC: 0.01 IU/ML
MITOGEN IGNF BCKGRD COR BLD-ACNC: 9.97 IU/ML
TB GOLD PLUS: NEGATIVE

## 2024-05-06 LAB
6-METHYLMERCAPTOPURINE RIBOSIDE: 6.9 NMOL/ML/H (ref 5.04–9.57)
6-METHYLMERCAPTOPURINE: 4.6 NMOL/ML/H (ref 3–6.66)
6-METHYLTHIOGUANINE RIBOSIDE: 3.32 NMOL/ML/H (ref 2.7–5.84)
TPMT INTERPRETATION: NORMAL
TPMT REVIEWED BY: NORMAL

## 2024-10-10 ENCOUNTER — PATIENT MESSAGE (OUTPATIENT)
Dept: GASTROENTEROLOGY | Facility: CLINIC | Age: 44
End: 2024-10-10
Payer: COMMERCIAL

## 2024-10-12 DIAGNOSIS — I10 ESSENTIAL HYPERTENSION: ICD-10-CM

## 2024-10-12 DIAGNOSIS — F51.04 CHRONIC INSOMNIA: ICD-10-CM

## 2024-10-14 RX ORDER — MIRTAZAPINE 30 MG/1
30 TABLET, FILM COATED ORAL
Qty: 30 TABLET | Refills: 5 | Status: SHIPPED | OUTPATIENT
Start: 2024-10-14

## 2024-10-14 RX ORDER — METOPROLOL SUCCINATE 25 MG/1
25 TABLET, EXTENDED RELEASE ORAL
Qty: 30 TABLET | Refills: 5 | Status: SHIPPED | OUTPATIENT
Start: 2024-10-14

## 2024-10-18 ENCOUNTER — PATIENT MESSAGE (OUTPATIENT)
Dept: INTERNAL MEDICINE | Facility: CLINIC | Age: 44
End: 2024-10-18

## 2025-02-12 DIAGNOSIS — F17.210 CIGARETTE NICOTINE DEPENDENCE WITHOUT COMPLICATION: ICD-10-CM

## 2025-02-12 RX ORDER — BUPROPION HYDROCHLORIDE 150 MG/1
TABLET, EXTENDED RELEASE ORAL
Qty: 60 TABLET | Refills: 0 | Status: SHIPPED | OUTPATIENT
Start: 2025-02-12

## 2025-02-12 NOTE — TELEPHONE ENCOUNTER
30 day supply approved. Patient due for annual physical. Needs to be seen prior to additional refills.

## 2025-03-06 ENCOUNTER — PATIENT MESSAGE (OUTPATIENT)
Dept: ADMINISTRATIVE | Facility: HOSPITAL | Age: 45
End: 2025-03-06
Payer: COMMERCIAL

## 2025-03-17 ENCOUNTER — PATIENT MESSAGE (OUTPATIENT)
Dept: INTERNAL MEDICINE | Facility: CLINIC | Age: 45
End: 2025-03-17
Payer: COMMERCIAL

## 2025-04-01 ENCOUNTER — PATIENT OUTREACH (OUTPATIENT)
Dept: ADMINISTRATIVE | Facility: HOSPITAL | Age: 45
End: 2025-04-01
Payer: COMMERCIAL

## 2025-04-11 DIAGNOSIS — E78.2 MIXED HYPERLIPIDEMIA: ICD-10-CM

## 2025-04-11 DIAGNOSIS — F51.04 CHRONIC INSOMNIA: ICD-10-CM

## 2025-04-14 RX ORDER — ATORVASTATIN CALCIUM 10 MG/1
10 TABLET, FILM COATED ORAL
Qty: 30 TABLET | Refills: 0 | Status: SHIPPED | OUTPATIENT
Start: 2025-04-14

## 2025-04-14 RX ORDER — MIRTAZAPINE 30 MG/1
30 TABLET, FILM COATED ORAL NIGHTLY
Qty: 30 TABLET | Refills: 0 | Status: SHIPPED | OUTPATIENT
Start: 2025-04-14

## 2025-07-23 ENCOUNTER — OFFICE VISIT (OUTPATIENT)
Dept: FAMILY MEDICINE | Facility: CLINIC | Age: 45
End: 2025-07-23
Payer: COMMERCIAL

## 2025-07-23 VITALS
HEART RATE: 77 BPM | WEIGHT: 248.25 LBS | RESPIRATION RATE: 16 BRPM | DIASTOLIC BLOOD PRESSURE: 88 MMHG | TEMPERATURE: 98 F | BODY MASS INDEX: 31.86 KG/M2 | HEIGHT: 74 IN | OXYGEN SATURATION: 96 % | SYSTOLIC BLOOD PRESSURE: 136 MMHG

## 2025-07-23 DIAGNOSIS — I10 ESSENTIAL HYPERTENSION: ICD-10-CM

## 2025-07-23 DIAGNOSIS — F51.04 CHRONIC INSOMNIA: ICD-10-CM

## 2025-07-23 DIAGNOSIS — E78.2 MIXED HYPERLIPIDEMIA: ICD-10-CM

## 2025-07-23 DIAGNOSIS — F17.200 TOBACCO USE DISORDER: ICD-10-CM

## 2025-07-23 DIAGNOSIS — M54.50 ACUTE MIDLINE LOW BACK PAIN WITHOUT SCIATICA: Primary | ICD-10-CM

## 2025-07-23 DIAGNOSIS — K50.00 TERMINAL ILEITIS WITHOUT COMPLICATION: ICD-10-CM

## 2025-07-23 PROCEDURE — 96372 THER/PROPH/DIAG INJ SC/IM: CPT | Mod: S$GLB,,, | Performed by: FAMILY MEDICINE

## 2025-07-23 PROCEDURE — 3008F BODY MASS INDEX DOCD: CPT | Mod: CPTII,S$GLB,, | Performed by: FAMILY MEDICINE

## 2025-07-23 PROCEDURE — 99999 PR PBB SHADOW E&M-EST. PATIENT-LVL IV: CPT | Mod: PBBFAC,,, | Performed by: FAMILY MEDICINE

## 2025-07-23 PROCEDURE — 3075F SYST BP GE 130 - 139MM HG: CPT | Mod: CPTII,S$GLB,, | Performed by: FAMILY MEDICINE

## 2025-07-23 PROCEDURE — 1159F MED LIST DOCD IN RCRD: CPT | Mod: CPTII,S$GLB,, | Performed by: FAMILY MEDICINE

## 2025-07-23 PROCEDURE — 3079F DIAST BP 80-89 MM HG: CPT | Mod: CPTII,S$GLB,, | Performed by: FAMILY MEDICINE

## 2025-07-23 PROCEDURE — 99214 OFFICE O/P EST MOD 30 MIN: CPT | Mod: 25,S$GLB,, | Performed by: FAMILY MEDICINE

## 2025-07-23 RX ORDER — KETOROLAC TROMETHAMINE 30 MG/ML
30 INJECTION, SOLUTION INTRAMUSCULAR; INTRAVENOUS
Status: COMPLETED | OUTPATIENT
Start: 2025-07-23 | End: 2025-07-23

## 2025-07-23 RX ORDER — MIRTAZAPINE 30 MG/1
30 TABLET, FILM COATED ORAL NIGHTLY
Qty: 90 TABLET | Refills: 0 | Status: SHIPPED | OUTPATIENT
Start: 2025-07-23

## 2025-07-23 RX ORDER — METHOCARBAMOL 750 MG/1
750 TABLET, FILM COATED ORAL
Qty: 40 TABLET | Refills: 0 | Status: SHIPPED | OUTPATIENT
Start: 2025-07-23 | End: 2025-08-02

## 2025-07-23 RX ADMIN — KETOROLAC TROMETHAMINE 30 MG: 30 INJECTION, SOLUTION INTRAMUSCULAR; INTRAVENOUS at 08:07

## 2025-07-23 NOTE — LETTER
July 23, 2025      [facility]  [address]  [facility] [address]  Phone: [phone]  Fax: [phone]       Patient: Gunnar Galindo   YOB: 1980  Date of Visit: 07/23/2025    To Whom It May Concern:    Damien Galindo  was at [facility] on 07/23/2025. The patient may return to work/school on 07/28/2025 with no restrictions. If you have any questions or concerns, or if I can be of further assistance, please do not hesitate to contact me.    Sincerely,    LISE Dai

## 2025-07-23 NOTE — PROGRESS NOTES
"Subjective:       Patient ID: Gunnar Galindo is a 44 y.o. male.    Chief Complaint: Back Pain      HPI Comments:     Current Medications[1]      This is my 1st time seeing this patient.    He started having midline low back pain yesterday morning when he got up.  Somewhat worse today.  Thinks he may have lifted something wrong on Monday but does not recall any event or moment of onset.  No pain or weakness radiating down the legs.  No bladder or bowel control problems.  No previous significant problems with back issues    History of ileitis.  Was told not to take NSAIDs, but has been taking lots of ibuprofen since the onset of his back pain.  Also using heat.  Nothing seems to be helping.  He is due to have colonoscopy has a six-month follow-up after the findings last time.  He was unaware of this, taking it he has a 10 year follow-up plan.  He says he will take care of scheduling this follow-up colonoscopy.    Smokes about half pack per day.    Compliant with his medications which include atorvastatin, bupropion, metoprolol, mirtazapine.  Would like a refill on the latter which she uses "for sleep".    Review of Systems   Constitutional:  Negative for activity change, appetite change and fever.   HENT:  Negative for sore throat.    Respiratory:  Negative for cough and shortness of breath.    Cardiovascular:  Negative for chest pain.   Gastrointestinal:  Negative for abdominal pain, diarrhea and nausea.   Genitourinary:  Negative for difficulty urinating.   Musculoskeletal:  Positive for back pain. Negative for arthralgias and myalgias.   Neurological:  Negative for dizziness and headaches.       Objective:      Vitals:    07/23/25 0801 07/23/25 0811   BP: (!) 140/90 136/88   Pulse: 77    Resp: 16    Temp: 97.5 °F (36.4 °C)    TempSrc: Tympanic    SpO2: 96%    Weight: 112.6 kg (248 lb 3.8 oz)    Height: 6' 2" (1.88 m)    PainSc:   6    PainLoc: Back      Physical Exam  Vitals and nursing note reviewed. "   Constitutional:       General: He is not in acute distress.     Appearance: He is well-developed. He is not diaphoretic.   HENT:      Head: Normocephalic.   Neck:      Thyroid: No thyromegaly.   Cardiovascular:      Rate and Rhythm: Normal rate and regular rhythm.      Heart sounds: Normal heart sounds. No murmur heard.  Pulmonary:      Effort: Pulmonary effort is normal.      Breath sounds: Normal breath sounds. No wheezing or rales.   Abdominal:      General: There is no distension.      Palpations: Abdomen is soft.   Musculoskeletal:      Cervical back: Neck supple.      Lumbar back: Spasms, tenderness and bony tenderness present. Decreased range of motion.        Back:       Comments: Area of concern and tenderness.  Mild right paraspinal spasm   Lymphadenopathy:      Cervical: No cervical adenopathy.   Skin:     General: Skin is warm and dry.   Neurological:      Mental Status: He is alert and oriented to person, place, and time.   Psychiatric:         Mood and Affect: Mood normal.         Behavior: Behavior normal.         Thought Content: Thought content normal.         Judgment: Judgment normal.         Assessment:       1. Acute midline low back pain without sciatica    2. Essential hypertension    3. Mixed hyperlipidemia    4. Terminal ileitis without complication    5. Tobacco use disorder    6. Chronic insomnia        Plan:   Acute midline low back pain without sciatica  Comments:  Toradol IM.  Robaxin.  Follow-up if persistent disability in 2 weeks  Orders:  -     methocarbamoL (ROBAXIN) 750 MG Tab; Take 1 tablet (750 mg total) by mouth every meal as needed.  Dispense: 40 tablet; Refill: 0  -     ketorolac injection 30 mg    Essential hypertension  Comments:  Follow-up with PCP for checkup soon.  Compliant with metoprolol    Mixed hyperlipidemia  Comments:      Terminal ileitis without complication  Comments:  Continue to avoid NSAIDs.  Follow-up colonoscopy due now    Tobacco use  disorder  Comments:  Smokes 1/2 pack per day    Chronic insomnia  Comments:  Refill Remeron today  Orders:  -     mirtazapine (REMERON) 30 MG tablet; Take 1 tablet (30 mg total) by mouth every evening.  Dispense: 90 tablet; Refill: 0                 [1]   Current Outpatient Medications:     atorvastatin (LIPITOR) 10 MG tablet, TAKE 1 TABLET(10 MG) BY MOUTH DAILY, Disp: 30 tablet, Rfl: 0    buPROPion (WELLBUTRIN SR) 150 MG TBSR 12 hr tablet, TAKE 1 TABLET BY MOUTH EVERY DAY FOR 7 DAYS THEN TAKE 1 TABLET BY MOUTH TWICE DAILY, Disp: 60 tablet, Rfl: 0    ibuprofen (ADVIL,MOTRIN) 800 MG tablet, TK 1 T PO  Q 6 TO 8 H PRN P, Disp: , Rfl:     metoprolol succinate (TOPROL-XL) 25 MG 24 hr tablet, TAKE 1 TABLET(25 MG) BY MOUTH EVERY DAY, Disp: 30 tablet, Rfl: 5    methocarbamoL (ROBAXIN) 750 MG Tab, Take 1 tablet (750 mg total) by mouth every meal as needed., Disp: 40 tablet, Rfl: 0    mirtazapine (REMERON) 30 MG tablet, Take 1 tablet (30 mg total) by mouth every evening., Disp: 90 tablet, Rfl: 0    Current Facility-Administered Medications:     ketorolac injection 30 mg, 30 mg, Intramuscular, 1 time in Clinic/HOD,

## 2025-07-23 NOTE — LETTER
July 23, 2025        Gunnar Galindo  [address]  [place] [address]             [facility]  [address]  [facility] [place] [address]  Phone: [phone]  Fax: [phone]   Patient: Gnunar Galindo   MR Number: [id number]   YOB: 1980   Date of Visit: 7/23/2025       Dear Dr. Galindo:    Thank you for referring Gunnar Galindo to me for evaluation. Below are the relevant portions of my assessment and plan of care.            If you have questions, please do not hesitate to call me. I look forward to following Gunnar along with you.    Sincerely,      Kenny Penaloza MD           CC  No Recipients